# Patient Record
Sex: FEMALE | Race: WHITE | NOT HISPANIC OR LATINO | Employment: UNEMPLOYED | URBAN - METROPOLITAN AREA
[De-identification: names, ages, dates, MRNs, and addresses within clinical notes are randomized per-mention and may not be internally consistent; named-entity substitution may affect disease eponyms.]

---

## 2024-01-18 ENCOUNTER — APPOINTMENT (OUTPATIENT)
Dept: RADIOLOGY | Facility: EXTERNAL LOCATION | Age: 69
End: 2024-01-18
Payer: MEDICARE

## 2024-01-18 ENCOUNTER — TELEPHONE (OUTPATIENT)
Dept: INPATIENT UNIT | Facility: HOSPITAL | Age: 69
End: 2024-01-18

## 2024-01-18 ENCOUNTER — HOSPITAL ENCOUNTER (INPATIENT)
Facility: HOSPITAL | Age: 69
LOS: 8 days | Discharge: HOME | DRG: 444 | End: 2024-01-26
Attending: INTERNAL MEDICINE | Admitting: INTERNAL MEDICINE
Payer: MEDICARE

## 2024-01-18 DIAGNOSIS — K80.50 CHOLEDOCHOLITHIASIS: ICD-10-CM

## 2024-01-18 DIAGNOSIS — T40.2X5A CONSTIPATION DUE TO OPIOID THERAPY: ICD-10-CM

## 2024-01-18 DIAGNOSIS — F41.9 ANXIETY: ICD-10-CM

## 2024-01-18 DIAGNOSIS — K85.20 ALCOHOL-INDUCED ACUTE PANCREATITIS WITHOUT INFECTION OR NECROSIS (HHS-HCC): Primary | ICD-10-CM

## 2024-01-18 DIAGNOSIS — F32.A DEPRESSION, UNSPECIFIED DEPRESSION TYPE: ICD-10-CM

## 2024-01-18 DIAGNOSIS — K59.03 CONSTIPATION DUE TO OPIOID THERAPY: ICD-10-CM

## 2024-01-18 LAB
ABO GROUP (TYPE) IN BLOOD: NORMAL
ALBUMIN SERPL BCP-MCNC: 3.2 G/DL (ref 3.4–5)
ALP SERPL-CCNC: 65 U/L (ref 33–136)
ALT SERPL W P-5'-P-CCNC: 10 U/L (ref 7–45)
ANION GAP SERPL CALC-SCNC: 10 MMOL/L (ref 10–20)
ANTIBODY SCREEN: NORMAL
APTT PPP: 30 SECONDS (ref 27–38)
AST SERPL W P-5'-P-CCNC: 17 U/L (ref 9–39)
BASOPHILS # BLD AUTO: 0.02 X10*3/UL (ref 0–0.1)
BASOPHILS NFR BLD AUTO: 0.3 %
BILIRUB DIRECT SERPL-MCNC: 0.1 MG/DL (ref 0–0.3)
BILIRUB SERPL-MCNC: 0.4 MG/DL (ref 0–1.2)
BUN SERPL-MCNC: 4 MG/DL (ref 6–23)
CALCIUM SERPL-MCNC: 8.9 MG/DL (ref 8.6–10.6)
CHLORIDE SERPL-SCNC: 102 MMOL/L (ref 98–107)
CHOLEST SERPL-MCNC: 136 MG/DL (ref 0–199)
CHOLESTEROL/HDL RATIO: 2.3
CO2 SERPL-SCNC: 31 MMOL/L (ref 21–32)
CREAT SERPL-MCNC: 0.52 MG/DL (ref 0.5–1.05)
EGFRCR SERPLBLD CKD-EPI 2021: >90 ML/MIN/1.73M*2
EOSINOPHIL # BLD AUTO: 0.08 X10*3/UL (ref 0–0.7)
EOSINOPHIL NFR BLD AUTO: 1.4 %
ERYTHROCYTE [DISTWIDTH] IN BLOOD BY AUTOMATED COUNT: 14.3 % (ref 11.5–14.5)
GLUCOSE SERPL-MCNC: 70 MG/DL (ref 74–99)
HCT VFR BLD AUTO: 33.8 % (ref 36–46)
HDLC SERPL-MCNC: 59.3 MG/DL
HGB BLD-MCNC: 11.2 G/DL (ref 12–16)
IMM GRANULOCYTES # BLD AUTO: 0.01 X10*3/UL (ref 0–0.7)
IMM GRANULOCYTES NFR BLD AUTO: 0.2 % (ref 0–0.9)
INR PPP: 1 (ref 0.9–1.1)
LDLC SERPL CALC-MCNC: 64 MG/DL
LIPASE SERPL-CCNC: 132 U/L (ref 9–82)
LYMPHOCYTES # BLD AUTO: 1.96 X10*3/UL (ref 1.2–4.8)
LYMPHOCYTES NFR BLD AUTO: 33.2 %
MAGNESIUM SERPL-MCNC: 1.17 MG/DL (ref 1.6–2.4)
MCH RBC QN AUTO: 34.1 PG (ref 26–34)
MCHC RBC AUTO-ENTMCNC: 33.1 G/DL (ref 32–36)
MCV RBC AUTO: 103 FL (ref 80–100)
MONOCYTES # BLD AUTO: 0.58 X10*3/UL (ref 0.1–1)
MONOCYTES NFR BLD AUTO: 9.8 %
NEUTROPHILS # BLD AUTO: 3.25 X10*3/UL (ref 1.2–7.7)
NEUTROPHILS NFR BLD AUTO: 55.1 %
NON HDL CHOLESTEROL: 77 MG/DL (ref 0–149)
NRBC BLD-RTO: 0 /100 WBCS (ref 0–0)
PHOSPHATE SERPL-MCNC: 3.4 MG/DL (ref 2.5–4.9)
PLATELET # BLD AUTO: 196 X10*3/UL (ref 150–450)
POTASSIUM SERPL-SCNC: 3.4 MMOL/L (ref 3.5–5.3)
PROT SERPL-MCNC: 5.7 G/DL (ref 6.4–8.2)
PROTHROMBIN TIME: 10.9 SECONDS (ref 9.8–12.8)
RBC # BLD AUTO: 3.28 X10*6/UL (ref 4–5.2)
RH FACTOR (ANTIGEN D): NORMAL
SODIUM SERPL-SCNC: 140 MMOL/L (ref 136–145)
TRIGL SERPL-MCNC: 66 MG/DL (ref 0–149)
VLDL: 13 MG/DL (ref 0–40)
WBC # BLD AUTO: 5.9 X10*3/UL (ref 4.4–11.3)

## 2024-01-18 PROCEDURE — 36415 COLL VENOUS BLD VENIPUNCTURE: CPT

## 2024-01-18 PROCEDURE — 80321 ALCOHOLS BIOMARKERS 1OR 2: CPT

## 2024-01-18 PROCEDURE — 2500000001 HC RX 250 WO HCPCS SELF ADMINISTERED DRUGS (ALT 637 FOR MEDICARE OP)

## 2024-01-18 PROCEDURE — 2500000004 HC RX 250 GENERAL PHARMACY W/ HCPCS (ALT 636 FOR OP/ED)

## 2024-01-18 PROCEDURE — 85025 COMPLETE CBC W/AUTO DIFF WBC: CPT

## 2024-01-18 PROCEDURE — 84100 ASSAY OF PHOSPHORUS: CPT

## 2024-01-18 PROCEDURE — 83690 ASSAY OF LIPASE: CPT

## 2024-01-18 PROCEDURE — 85730 THROMBOPLASTIN TIME PARTIAL: CPT

## 2024-01-18 PROCEDURE — 80053 COMPREHEN METABOLIC PANEL: CPT

## 2024-01-18 PROCEDURE — 1210000001 HC SEMI-PRIVATE ROOM DAILY

## 2024-01-18 PROCEDURE — 83735 ASSAY OF MAGNESIUM: CPT

## 2024-01-18 PROCEDURE — 80061 LIPID PANEL: CPT

## 2024-01-18 PROCEDURE — 97161 PT EVAL LOW COMPLEX 20 MIN: CPT | Mod: GP | Performed by: PHYSICAL THERAPIST

## 2024-01-18 PROCEDURE — 86901 BLOOD TYPING SEROLOGIC RH(D): CPT

## 2024-01-18 PROCEDURE — 82248 BILIRUBIN DIRECT: CPT

## 2024-01-18 RX ORDER — MAGNESIUM SULFATE HEPTAHYDRATE 40 MG/ML
4 INJECTION, SOLUTION INTRAVENOUS ONCE
Status: DISCONTINUED | OUTPATIENT
Start: 2024-01-18 | End: 2024-01-18

## 2024-01-18 RX ORDER — OXYCODONE HYDROCHLORIDE 5 MG/1
7.5 TABLET ORAL EVERY 4 HOURS PRN
Status: COMPLETED | OUTPATIENT
Start: 2024-01-18 | End: 2024-01-18

## 2024-01-18 RX ORDER — MAGNESIUM SULFATE HEPTAHYDRATE 40 MG/ML
4 INJECTION, SOLUTION INTRAVENOUS ONCE
Status: COMPLETED | OUTPATIENT
Start: 2024-01-18 | End: 2024-01-18

## 2024-01-18 RX ORDER — POTASSIUM CHLORIDE 20 MEQ/1
40 TABLET, EXTENDED RELEASE ORAL ONCE
Status: COMPLETED | OUTPATIENT
Start: 2024-01-18 | End: 2024-01-18

## 2024-01-18 RX ORDER — LANOLIN ALCOHOL/MO/W.PET/CERES
100 CREAM (GRAM) TOPICAL DAILY
Status: DISCONTINUED | OUTPATIENT
Start: 2024-01-18 | End: 2024-01-26 | Stop reason: HOSPADM

## 2024-01-18 RX ORDER — TALC
6 POWDER (GRAM) TOPICAL NIGHTLY
Status: DISCONTINUED | OUTPATIENT
Start: 2024-01-18 | End: 2024-01-26 | Stop reason: HOSPADM

## 2024-01-18 RX ORDER — ENOXAPARIN SODIUM 100 MG/ML
40 INJECTION SUBCUTANEOUS EVERY 24 HOURS
Status: DISCONTINUED | OUTPATIENT
Start: 2024-01-18 | End: 2024-01-26 | Stop reason: HOSPADM

## 2024-01-18 RX ORDER — SERTRALINE HYDROCHLORIDE 100 MG/1
100 TABLET, FILM COATED ORAL DAILY
Status: DISCONTINUED | OUTPATIENT
Start: 2024-01-18 | End: 2024-01-26 | Stop reason: HOSPADM

## 2024-01-18 RX ORDER — ENOXAPARIN SODIUM 100 MG/ML
40 INJECTION SUBCUTANEOUS EVERY 24 HOURS
Status: DISCONTINUED | OUTPATIENT
Start: 2024-01-18 | End: 2024-01-18

## 2024-01-18 RX ORDER — ACETAMINOPHEN 650 MG/1
650 SUPPOSITORY RECTAL EVERY 4 HOURS PRN
Status: DISCONTINUED | OUTPATIENT
Start: 2024-01-18 | End: 2024-01-21

## 2024-01-18 RX ORDER — TRAZODONE HYDROCHLORIDE 50 MG/1
100 TABLET ORAL NIGHTLY
Status: DISCONTINUED | OUTPATIENT
Start: 2024-01-18 | End: 2024-01-26 | Stop reason: HOSPADM

## 2024-01-18 RX ORDER — ACETAMINOPHEN 160 MG/5ML
650 SOLUTION ORAL EVERY 4 HOURS PRN
Status: DISCONTINUED | OUTPATIENT
Start: 2024-01-18 | End: 2024-01-21

## 2024-01-18 RX ORDER — ACETAMINOPHEN 325 MG/1
650 TABLET ORAL EVERY 4 HOURS PRN
Status: DISCONTINUED | OUTPATIENT
Start: 2024-01-18 | End: 2024-01-21

## 2024-01-18 RX ORDER — POLYETHYLENE GLYCOL 3350 17 G/17G
17 POWDER, FOR SOLUTION ORAL NIGHTLY
Status: DISCONTINUED | OUTPATIENT
Start: 2024-01-18 | End: 2024-01-22

## 2024-01-18 RX ORDER — SUCRALFATE 1 G/1
1 TABLET ORAL
Status: DISCONTINUED | OUTPATIENT
Start: 2024-01-18 | End: 2024-01-26 | Stop reason: HOSPADM

## 2024-01-18 RX ORDER — HYDROMORPHONE HYDROCHLORIDE 2 MG/1
1 TABLET ORAL
Status: DISCONTINUED | OUTPATIENT
Start: 2024-01-18 | End: 2024-01-18

## 2024-01-18 RX ORDER — BUPROPION HYDROCHLORIDE 150 MG/1
150 TABLET ORAL DAILY
Status: DISCONTINUED | OUTPATIENT
Start: 2024-01-18 | End: 2024-01-26 | Stop reason: HOSPADM

## 2024-01-18 RX ORDER — ENOXAPARIN SODIUM 100 MG/ML
30 INJECTION SUBCUTANEOUS EVERY 24 HOURS
Status: DISCONTINUED | OUTPATIENT
Start: 2024-01-18 | End: 2024-01-18

## 2024-01-18 RX ORDER — ARIPIPRAZOLE 2 MG/1
2 TABLET ORAL NIGHTLY
Status: DISCONTINUED | OUTPATIENT
Start: 2024-01-18 | End: 2024-01-26 | Stop reason: HOSPADM

## 2024-01-18 RX ORDER — POTASSIUM CHLORIDE 1.5 G/1.58G
40 POWDER, FOR SOLUTION ORAL ONCE
Status: COMPLETED | OUTPATIENT
Start: 2024-01-18 | End: 2024-01-18

## 2024-01-18 RX ADMIN — OXYCODONE HYDROCHLORIDE 7.5 MG: 5 TABLET ORAL at 05:11

## 2024-01-18 RX ADMIN — OXYCODONE HYDROCHLORIDE 7.5 MG: 5 TABLET ORAL at 09:22

## 2024-01-18 RX ADMIN — ACETAMINOPHEN 650 MG: 325 TABLET ORAL at 11:18

## 2024-01-18 RX ADMIN — SERTRALINE HYDROCHLORIDE 100 MG: 100 TABLET ORAL at 08:24

## 2024-01-18 RX ADMIN — MELATONIN 6 MG: 3 TAB ORAL at 20:15

## 2024-01-18 RX ADMIN — POTASSIUM CHLORIDE 40 MEQ: 1.5 POWDER, FOR SOLUTION ORAL at 14:58

## 2024-01-18 RX ADMIN — POTASSIUM CHLORIDE 40 MEQ: 1500 TABLET, EXTENDED RELEASE ORAL at 17:20

## 2024-01-18 RX ADMIN — ACETAMINOPHEN 650 MG: 325 TABLET ORAL at 14:58

## 2024-01-18 RX ADMIN — ARIPIPRAZOLE 2 MG: 2 TABLET ORAL at 20:15

## 2024-01-18 RX ADMIN — BUPROPION HYDROCHLORIDE 150 MG: 150 TABLET, FILM COATED, EXTENDED RELEASE ORAL at 08:24

## 2024-01-18 RX ADMIN — ACETAMINOPHEN 650 MG: 325 TABLET ORAL at 19:05

## 2024-01-18 RX ADMIN — MAGNESIUM SULFATE 4 G: 4 INJECTION INTRAVENOUS at 11:18

## 2024-01-18 RX ADMIN — ACETAMINOPHEN 650 MG: 325 TABLET ORAL at 05:11

## 2024-01-18 RX ADMIN — ENOXAPARIN SODIUM 40 MG: 100 INJECTION SUBCUTANEOUS at 14:58

## 2024-01-18 RX ADMIN — TRAZODONE HYDROCHLORIDE 100 MG: 50 TABLET ORAL at 20:15

## 2024-01-18 RX ADMIN — THIAMINE HCL TAB 100 MG 100 MG: 100 TAB at 08:24

## 2024-01-18 RX ADMIN — SODIUM CHLORIDE, POTASSIUM CHLORIDE, SODIUM LACTATE AND CALCIUM CHLORIDE 500 ML: 600; 310; 30; 20 INJECTION, SOLUTION INTRAVENOUS at 05:13

## 2024-01-18 SDOH — SOCIAL STABILITY: SOCIAL INSECURITY: DO YOU FEEL ANYONE HAS EXPLOITED OR TAKEN ADVANTAGE OF YOU FINANCIALLY OR OF YOUR PERSONAL PROPERTY?: NO

## 2024-01-18 SDOH — SOCIAL STABILITY: SOCIAL INSECURITY: DO YOU FEEL UNSAFE GOING BACK TO THE PLACE WHERE YOU ARE LIVING?: NO

## 2024-01-18 SDOH — SOCIAL STABILITY: SOCIAL INSECURITY: HAVE YOU HAD THOUGHTS OF HARMING ANYONE ELSE?: NO

## 2024-01-18 SDOH — SOCIAL STABILITY: SOCIAL INSECURITY: ARE YOU OR HAVE YOU BEEN THREATENED OR ABUSED PHYSICALLY, EMOTIONALLY, OR SEXUALLY BY ANYONE?: NO

## 2024-01-18 SDOH — SOCIAL STABILITY: SOCIAL INSECURITY: HAS ANYONE EVER THREATENED TO HURT YOUR FAMILY OR YOUR PETS?: NO

## 2024-01-18 SDOH — SOCIAL STABILITY: SOCIAL INSECURITY: DOES ANYONE TRY TO KEEP YOU FROM HAVING/CONTACTING OTHER FRIENDS OR DOING THINGS OUTSIDE YOUR HOME?: NO

## 2024-01-18 SDOH — SOCIAL STABILITY: SOCIAL INSECURITY: ABUSE: ADULT

## 2024-01-18 SDOH — SOCIAL STABILITY: SOCIAL INSECURITY: WERE YOU ABLE TO COMPLETE ALL THE BEHAVIORAL HEALTH SCREENINGS?: YES

## 2024-01-18 SDOH — SOCIAL STABILITY: SOCIAL INSECURITY: ARE THERE ANY APPARENT SIGNS OF INJURIES/BEHAVIORS THAT COULD BE RELATED TO ABUSE/NEGLECT?: NO

## 2024-01-18 ASSESSMENT — PAIN SCALES - GENERAL
PAINLEVEL_OUTOF10: 5 - MODERATE PAIN
PAINLEVEL_OUTOF10: 6
PAINLEVEL_OUTOF10: 6
PAINLEVEL_OUTOF10: 5 - MODERATE PAIN
PAINLEVEL_OUTOF10: 7

## 2024-01-18 ASSESSMENT — ACTIVITIES OF DAILY LIVING (ADL)
GROOMING: INDEPENDENT
PATIENT'S MEMORY ADEQUATE TO SAFELY COMPLETE DAILY ACTIVITIES?: YES
BATHING: INDEPENDENT
ADL_ASSISTANCE: INDEPENDENT
DRESSING YOURSELF: INDEPENDENT
JUDGMENT_ADEQUATE_SAFELY_COMPLETE_DAILY_ACTIVITIES: YES
ADEQUATE_TO_COMPLETE_ADL: YES
HEARING - LEFT EAR: FUNCTIONAL
WALKS IN HOME: INDEPENDENT
LACK_OF_TRANSPORTATION: NO
TOILETING: INDEPENDENT
FEEDING YOURSELF: INDEPENDENT
HEARING - RIGHT EAR: FUNCTIONAL

## 2024-01-18 ASSESSMENT — PAIN DESCRIPTION - LOCATION
LOCATION: ABDOMEN
LOCATION: BACK
LOCATION: ABDOMEN
LOCATION: ABDOMEN

## 2024-01-18 ASSESSMENT — COGNITIVE AND FUNCTIONAL STATUS - GENERAL
MOBILITY SCORE: 19
CLIMB 3 TO 5 STEPS WITH RAILING: A LOT
MOVING TO AND FROM BED TO CHAIR: A LITTLE
STANDING UP FROM CHAIR USING ARMS: A LITTLE
PATIENT BASELINE BEDBOUND: NO
MOBILITY SCORE: 24
DAILY ACTIVITIY SCORE: 24
WALKING IN HOSPITAL ROOM: A LITTLE

## 2024-01-18 ASSESSMENT — PATIENT HEALTH QUESTIONNAIRE - PHQ9
SUM OF ALL RESPONSES TO PHQ9 QUESTIONS 1 & 2: 0
2. FEELING DOWN, DEPRESSED OR HOPELESS: NOT AT ALL
1. LITTLE INTEREST OR PLEASURE IN DOING THINGS: NOT AT ALL

## 2024-01-18 ASSESSMENT — LIFESTYLE VARIABLES
HOW MANY STANDARD DRINKS CONTAINING ALCOHOL DO YOU HAVE ON A TYPICAL DAY: 1 OR 2
AUDIT-C TOTAL SCORE: 1
PRESCIPTION_ABUSE_PAST_12_MONTHS: NO
HOW OFTEN DO YOU HAVE 6 OR MORE DRINKS ON ONE OCCASION: NEVER
HOW OFTEN DO YOU HAVE A DRINK CONTAINING ALCOHOL: MONTHLY OR LESS
SKIP TO QUESTIONS 9-10: 1
AUDIT-C TOTAL SCORE: 1
SUBSTANCE_ABUSE_PAST_12_MONTHS: NO

## 2024-01-18 ASSESSMENT — COLUMBIA-SUICIDE SEVERITY RATING SCALE - C-SSRS
2. HAVE YOU ACTUALLY HAD ANY THOUGHTS OF KILLING YOURSELF?: NO
6. HAVE YOU EVER DONE ANYTHING, STARTED TO DO ANYTHING, OR PREPARED TO DO ANYTHING TO END YOUR LIFE?: NO
1. IN THE PAST MONTH, HAVE YOU WISHED YOU WERE DEAD OR WISHED YOU COULD GO TO SLEEP AND NOT WAKE UP?: NO

## 2024-01-18 ASSESSMENT — PAIN - FUNCTIONAL ASSESSMENT
PAIN_FUNCTIONAL_ASSESSMENT: 0-10

## 2024-01-18 NOTE — H&P
"Gastroenterology Night Float Admission Note    HPI  Ms. Massey is a 70 yo current smoker with a PMHx/PSHx significant for Bilroth II for perforated gastric ulcer, GJ ulcers, labeled to have chronic pancreatitis, chronic lower back pain s/p 3 surgeries, who presented to [facility] for abdominal pain, found to have pancreatitis and was transferred to [facility] for potential ERCP given findings on MRI c/f choledocholithiasis     She reports pain started Friday and she went to the hospital on Monday. The pain is all over and goes to her back. Associated with nausea and vomiting. Denies Hx of joint pains and/or chronic abdominal pain. She said she did well with a clear liquid diet, but had vomiting with a full liquid diet.      She reports a recent hospitalization in November for an abdominal abscess. Chart Review reveals admission with sepsis s/p drainage of LUQ abscess with negative cultures.     Reports occasional drinking, Last drink Thursday, 2 shots of vodka and tonic.      Denies Hx of strokes and heart attacks. Denies Hx of heart failure. Denies orthopnea and PND. Denies leg swelling. Denies headaches. Denies chest pain. Denies SOB. Denies fevers. Denies burning urination.    Discharge summary from OSH:   \"The patient is a 68 y.o. female ETOH/ Chronic pain with Failed low back / extensive GI history -with prior perforated Gastric ulcer, prior previous Billroth II  / on going smoking, and intraabdominal abscess s/p IR percutaneous drain and abx in November of last year. She had abd pain stat started on Friday. No fever, chills. She was sent to ED from PCP office due to abdominal pain with nausea and emesis. In ED labs sig for lipase of 880, Ct abd with pancreatic ductal abnormality and cytic lesions. Follow up MRI suggesting possible choledocholithiasis.  GI was consulted due to the complex abdomen they recommended transfer to [facility] for advance endoscopy evaluation in setting of prior Billroth II " "procedure. She received PO oxy and IV dilaudid for pain management. She was transferred in stable condition.\"    Medications at the OSH (patient unsure of home meds, does not have list on her, does not use inhalers):  Scheduled Meds: ARIPiprazole, 2 mg, Oral, Daily  buPROPion XL, 150 mg, Oral, Daily  enoxaparin, 40 mg, SubCUTAneous, Daily  melatonin, 6 mg, Oral, Nightly  pantoprazole, 40 mg, Oral, qAM AC  sertraline, 100 mg, Oral, Daily  sodium chloride 0.9%, 10 mL, IntraVENous, 2 times per day  sucralfate, 1 g, Oral, TID AC  thiamine, 100 mg, Oral, Daily  traZODone, 100 mg, Oral, Nightly     Continuous Infusions:lactated Ringer's, 150 mL/hr, Last Rate: 150 mL/hr (01/17/24 0803)     PRN Meds:PRN medications: HYDROmorphone, naloxone, ondansetron ODT **OR** ondansetron, oxyCODONE **OR** oxyCODONE, polyethylene glycol (PEG) 3350, sodium chloride, sodium chloride 0.9%        At the outside hospital (latest data):  - Labs:   CBC: WBC 5.3, Hgb 12.0, plt 177   BMP: Na 134, K 3.0, Cl 103, HCO3 24, BUN 5, Cr 0.51, glu 73   LFT: Ca 8.4, tprot 5.6, alb 3.0, alkphos 62, AST 33, ALT 11, tbili 0.5  Lipase 880, Triglycerides:96, ethanol <0.010  Trop: 0.032    UA: 1+ protein, Specific gravity >1.030, otherwise normal      - Imaging:  CT abdomen/pelvis with IV contrast 1/15/2024:   No acute abdominal or pelvic process is appreciated.  No mass or inflammatory process is seen.  There has been a prior cholecystectomy and mild extrahepatic biliary ductal dilatation.  In addition there is mild pancreatic ductal dislocation as well as several subcentimeter cystic lesions involving the pancreas of uncertain etiology.  Further evaluation with MRI may be of benefit and can be performed on a nonemergent basis.    MRI abdomen without IV contrast 1/16/2024:   1.  Limited evaluation in part due to motion artifact.   2.   Moderate intrahepatic biliary ductal dilatation and dilatation of the common bile duct measures up to 12 mm in caliber with " slight distal tapering. Apparent 5 mm ovoid filling defect in the distal periampullary common bile duct on coronal sequences with abrupt concave cut off/meniscal sign present on MRCP sequences, suggesting possible choledocholithiasis. Findings however potentially could be artifactual as they are only seen on coronal sequences and MRCP sequences without definitive correlate on axial sequences.   3.  Suspicion of mild acute pancreatitis.   4.  Dilatation of the main pancreatic duct measuring up to at least 4 to 5 mm at the level of the pancreatic body and tail with some abrupt but smooth tapering at the level of the pancreatic head where the pancreatic duct is normal in caliber.   5.  Some luminal irregularity and beading involving the pancreatic duct at the level of the pancreatic tail, probably due to prior pancreatitis. Several small pancreatic cystic lesions measuring up to 10 x 6 mm at the pancreatic tail. Additional smaller lesions (measuring up to 6 x 6 mm) and punctate cystic lesions towards the pancreatic body and head/uncinate. These may potentially communicate with the main pancreatic duct. Consider sidebranch IPMN, dilated side branch ducts, or possibly pseudocysts. Follow-up with MRI/MRCP in one year or as per clinical protocol.      PMH: as above  SurgHx: as above  Allergies: Reported none, then asked about naproxen, stated dry heaving, then   SocHx: EtOH: states is an occasional drinker (last drink Thursday 2 vodka tonics), Tobacco: 1ppd since age 20, Drugs: denies  FamHx: denies Hx of pancreatic disease, or cancers       PHYSICAL EXAM:  General: awake, alert, oriented to time, place, and self  HEENT: anicteric sclera  Chest: ctab, ?crackles left lower zones, no wheezing   Cardiac: regular rate and rhythm, normal s1, s2, no M/R/G  Abdomen:  soft, non-distended, tender throughout   EXT: no peripheral edema  Neuro: AOx3, moving all limbs freely     Assessment & Plan  Ms. Massey is a 68 yo current smoker,  current alcohol user, with a PMHx/PSHx significant for Bilroth II for perforated gastric ulcer, GJ ulcers, labeled to have chronic pancreatitis, chronic lower back pain s/p 3 surgeries, who presented to [facility] for abdominal pain, found to have pancreatitis and was transferred to [facility] for potential ERCP given findings on MRI c/f choledocholithiasis. Patient reports stomach pain since Friday radiating to the back associated with N/V. Was treated with fluids and pain meds at the OSH. CT with mild extrahepatic biliary ductal dilatation and mild pancreatic ductal dislocation as well as several subcentimeter cystic lesions involving the pancreas of uncertain etiology. MRI with Moderate intrahepatic biliary ductal dilatation and dilatation of the common bile duct measures up to 12 mm with slight distal tapering. Apparent 5 mm ovoid filling defect in the distal periampullary common bile duct on coronal sequences with abrupt concave cut off/meniscal sign present on MRCP sequences, suggesting possible choledocholithiasis. Findings however potentially could be artifactual as they are only seen on coronal sequences and MRCP sequences without definitive correlate on axial sequences. Dilatation of the main pancreatic duct measuring up to at least 4 to 5 mm at the level of the pancreatic body and tail with some abrupt but smooth tapering at the level of the pancreatic head where the pancreatic duct is normal in caliber.   Some luminal irregularity and beading involving the pancreatic duct at the level of the pancreatic tail, probably due to prior pancreatitis, and Several small pancreatic cystic lesions     #Impression  Pancreatitis: Alcoholic vs. stone related     #Abdominal Pain   #Acute Pancreatitis potentially due to choledocholithiasis   # ??? Chronic pancreatitis   #Pancreatic duct dilation  #Pancreatic cysts   ::lipase 880 at the outside hospital with typical symptoms (abdominal pain radiating to the  back)  ::possibility of choledocholithiasis on MRI (though can be artifiact)   ::s/p fluids in the OSH (150cc/hr LR)  -potential ERCP in the AM   -T+S, coags, NPO   -triglyceride level  -500cc LR for now, consider more in the AM (was on 150cc/hr at OSH)   -CD of images in chart, day team to take to Rad Ops once they open to upload to PACS  -will need follow up MRI/MRCP in one year   -hold sucralfate 1g TIDAC for now  -oxy 7.5mg q4hr PRN for 2 doses, day team to order addition   -tylenol     #Hypokalemia  ::likely due to GI losses   ::unclear if got repletion at OSH   -repeat labs before repletion     #Macrocytic Anemia  #Vitamin B12 deficiency   ::could be related to partial gastric resection   -repeat Vitamin B12   -consider IM B12 injections     #Misc  -continue aripiprazole 2mg QHS   -continue trazodone 100mg QHS  -continue melatonin 6mg QHS   -continue sertraline 100mg daily   -continue bupropion 150mg XL daily   -continue thiamine 100mg daily       F: 500cc now  E: PRN  N: NPO for potential procedure  A: PIVs  DVT: Lovenox (to start in the evening, follow post ERCP recs, may need to be stopped if intra procedure bleeding happens)  GI: Pantoprazole 40mg daily (continued from OSH)  SDM:  Earl: [phone]  Code Status: Full code (does not want to be machine dependent for a prolonged time)    Patient admitted by the  Night Float service and is to be staffed with the attending physician in the AM      Gabe Martin   Internal Medicine PGY-2   [facility]

## 2024-01-18 NOTE — PROGRESS NOTES
"Cece Massey is a 68 y.o. female on day 0 of admission presenting with Choledocholithiasis.    Subjective   Patient reports abdominal pain. Has not had emesis in a few days since her diet was advanced at OSH, has not had a bowel movement in several days. States she was able to tolerate clear liquid diet. Denies fevers, chills.       Objective     Physical Exam  Constitutional:       General: She is not in acute distress.     Appearance: Normal appearance. She is not ill-appearing or toxic-appearing.   HENT:      Head: Normocephalic and atraumatic.   Eyes:      Conjunctiva/sclera: Conjunctivae normal.   Cardiovascular:      Rate and Rhythm: Normal rate and regular rhythm.      Pulses: Normal pulses.      Heart sounds: Normal heart sounds.   Pulmonary:      Effort: Pulmonary effort is normal.   Abdominal:      General: Abdomen is flat. Bowel sounds are normal. There is no distension.      Palpations: Abdomen is soft.      Comments: Tender in epigastric area   Musculoskeletal:         General: No swelling.      Right lower leg: No edema.      Left lower leg: No edema.   Skin:     Capillary Refill: Capillary refill takes 2 to 3 seconds.   Neurological:      General: No focal deficit present.      Mental Status: She is alert and oriented to person, place, and time. Mental status is at baseline.   Psychiatric:         Mood and Affect: Mood normal.         Behavior: Behavior normal.         Last Recorded Vitals  Blood pressure 133/79, pulse 93, temperature 36.7 °C (98.1 °F), resp. rate 20, height 1.676 m (5' 6\"), weight 50.8 kg (112 lb), SpO2 96 %.  Intake/Output last 3 Shifts:  No intake/output data recorded.    Relevant Results  Results for orders placed or performed during the hospital encounter of 01/18/24 (from the past 24 hour(s))   CBC and Auto Differential   Result Value Ref Range    WBC 5.9 4.4 - 11.3 x10*3/uL    nRBC 0.0 0.0 - 0.0 /100 WBCs    RBC 3.28 (L) 4.00 - 5.20 x10*6/uL    Hemoglobin 11.2 (L) 12.0 - 16.0 " g/dL    Hematocrit 33.8 (L) 36.0 - 46.0 %     (H) 80 - 100 fL    MCH 34.1 (H) 26.0 - 34.0 pg    MCHC 33.1 32.0 - 36.0 g/dL    RDW 14.3 11.5 - 14.5 %    Platelets 196 150 - 450 x10*3/uL    Neutrophils % 55.1 40.0 - 80.0 %    Immature Granulocytes %, Automated 0.2 0.0 - 0.9 %    Lymphocytes % 33.2 13.0 - 44.0 %    Monocytes % 9.8 2.0 - 10.0 %    Eosinophils % 1.4 0.0 - 6.0 %    Basophils % 0.3 0.0 - 2.0 %    Neutrophils Absolute 3.25 1.20 - 7.70 x10*3/uL    Immature Granulocytes Absolute, Automated 0.01 0.00 - 0.70 x10*3/uL    Lymphocytes Absolute 1.96 1.20 - 4.80 x10*3/uL    Monocytes Absolute 0.58 0.10 - 1.00 x10*3/uL    Eosinophils Absolute 0.08 0.00 - 0.70 x10*3/uL    Basophils Absolute 0.02 0.00 - 0.10 x10*3/uL   Type And Screen   Result Value Ref Range    ABO TYPE A     Rh TYPE POS     ANTIBODY SCREEN NEG    Magnesium   Result Value Ref Range    Magnesium 1.17 (L) 1.60 - 2.40 mg/dL   Hepatic Function Panel   Result Value Ref Range    Albumin 3.2 (L) 3.4 - 5.0 g/dL    Bilirubin, Total 0.4 0.0 - 1.2 mg/dL    Bilirubin, Direct 0.1 0.0 - 0.3 mg/dL    Alkaline Phosphatase 65 33 - 136 U/L    ALT 10 7 - 45 U/L    AST 17 9 - 39 U/L    Total Protein 5.7 (L) 6.4 - 8.2 g/dL   Lipase   Result Value Ref Range    Lipase 132 (H) 9 - 82 U/L   Phosphorus   Result Value Ref Range    Phosphorus 3.4 2.5 - 4.9 mg/dL   Basic Metabolic Panel   Result Value Ref Range    Glucose 70 (L) 74 - 99 mg/dL    Sodium 140 136 - 145 mmol/L    Potassium 3.4 (L) 3.5 - 5.3 mmol/L    Chloride 102 98 - 107 mmol/L    Bicarbonate 31 21 - 32 mmol/L    Anion Gap 10 10 - 20 mmol/L    Urea Nitrogen 4 (L) 6 - 23 mg/dL    Creatinine 0.52 0.50 - 1.05 mg/dL    eGFR >90 >60 mL/min/1.73m*2    Calcium 8.9 8.6 - 10.6 mg/dL   Coagulation Screen   Result Value Ref Range    Protime 10.9 9.8 - 12.8 seconds    INR 1.0 0.9 - 1.1    aPTT 30 27 - 38 seconds   Lipid panel   Result Value Ref Range    Cholesterol 136 0 - 199 mg/dL    HDL-Cholesterol 59.3 mg/dL     Cholesterol/HDL Ratio 2.3     LDL Calculated 64 <=99 mg/dL    VLDL 13 0 - 40 mg/dL    Triglycerides 66 0 - 149 mg/dL    Non HDL Cholesterol 77 0 - 149 mg/dL              Assessment/Plan   Principal Problem:    Choledocholithiasis      Ms. Massey is a 68 yo F with h/o Bilroth II for perforated gastric ulcer, cholecystectomy, tobacco use disorder, chronic back pain, anxiety and depression who presented to an OSH with n/v, abd pain with labs showing lipase >800, consistent with acute pancreatitis, transferred to [facility] for possible ERCP given findings of possible choledocholithiasis on MRI/MRCP.       Updates 1/18:  - OSH imaging uploaded to PACS, pending read; will review for choledocholithiasis       #Acute pancreatitis  #Pancreatic duct dilation  :: Patient presented with n/v, abd pain, lipase 880 at OSH. Received supportive care, did not tolerate full liquid diet  :: Etiologies include etoh, gallstones given history and imaging. Patient denies trauma, TGs and calcium wnl, not on steroids  :: MRI/MRCP at OSH(1/16): Apparent 5 mm ovoid filling defect in the distal periampullary common bile duct on coronal sequences with abrupt concave cut off/meniscal sign present on MRCP sequences, suggesting possible choledocholithiasis. Dilatation of the main pancreatic duct measuring up to at least 4 to 5 mm at the level of the pancreatic body and tail   - Clear liquid diet, will advance as tolerated  - Tylenol 650mg q4h PRN        #Pancreatic cysts  :: MRI/MRCP at OSH (1/16): Several small pancreatic cystic lesions measuring up to 10 x 6 mm at the pancreatic tail. Additional smaller lesions (measuring up to 6 x 6 mm) and punctate cystic lesions towards the pancreatic body and head/uncinate. These may potentially communicate with the main pancreatic duct.   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  - Daily B12 supplementation        #Anxiety  #Depression    -continue aripiprazole 2mg QHS   -continue trazodone 100mg QHS  -continue melatonin 6mg QHS   -continue sertraline 100mg daily   -continue bupropion 150mg XL daily   -continue thiamine 100mg daily        #Tobacco use disorder  :: Patient declined nicotine replacement      F: PRN  E: PRN  N: Clear liquid diet  A: PIVs  DVT: Lovenox   GI: Pantoprazole 40mg daily (continued from OSH)    SDM:  Earl: [phone]  Code Status: Full code (does not want to be machine dependent for a prolonged time)    Patient seen and staffed with attending physician.  Janie Gee MD  Internal Medicine, PGY-1

## 2024-01-18 NOTE — PROGRESS NOTES
Physical Therapy    Physical Therapy Evaluation    Patient Name: Cece Massey  MRN: [id number]  Today's Date: 1/18/2024   Time Calculation  Start Time: 0914  Stop Time: 0931  Time Calculation (min): 17 min    Assessment/Plan   PT Assessment  PT Assessment Results: Decreased strength, Decreased endurance, Impaired balance, Decreased mobility, Pain  Rehab Prognosis: Good  Evaluation/Treatment Tolerance: Patient tolerated treatment well  End of Session Communication: Bedside nurse  Assessment Comment: Pt. is a 69 y/o F admitted for ERCP. Pt. presents with abdominal pain, impaired balance, decreased endurance, and difficulty with all functional mobility compared to baseline. Pt. would benefit from skilled PT while IP to address these deficits.  End of Session Patient Position: Up in chair, Alarm off, not on at start of session  IP OR SWING BED PT PLAN  Inpatient or Swing Bed: Inpatient  PT Plan  Treatment/Interventions: Bed mobility, Transfer training, Gait training, Stair training, Balance training, Strengthening, Endurance training, Therapeutic exercise, Therapeutic activity, Home exercise program  PT Plan: Skilled PT  PT Frequency: 3 times per week  PT Discharge Recommendations: Low intensity level of continued care  Equipment Recommended upon Discharge:  (Pt. may benefit from rollator secondary to limited standing tolerance.)  PT Recommended Transfer Status: Assist x1  PT - OK to Discharge: Yes (PT evaluation complete and rehab recommendations made.)    Subjective         General Visit Information:  General  Reason for Referral: transferred to [facility] for potential ERCP given findings on MRI c/f choledocholithiasis  Past Medical History Relevant to Rehab: PMHx/PSHx significant for Bilroth II for perforated gastric ulcer, GJ ulcers, labeled to have chronic pancreatitis, chronic lower back pain s/p 3 surgeries  Family/Caregiver Present: Yes  Caregiver Feedback: supportive  present at the bedside throughout  session.  Prior to Session Communication: Bedside nurse  Patient Position Received:  (standing in bathroom)  General Comment: Pt. returning from bathroom upon arrival. Endorses abdominal pain but pleasant and agreeable to participate    Home Living:  Home Living  Type of Home: Mobile home  Lives With: Spouse ( able to provide 24/7 support)  Home Adaptive Equipment: None  Home Layout: One level  Home Access: Stairs to enter with rails  Entrance Stairs-Number of Steps: 3  Bathroom Shower/Tub: Tub/shower unit  Bathroom Equipment: Shower chair with back    Prior Level of Function:  Prior Function Per Pt/Caregiver Report  Level of [place]: Independent with ADLs and functional transfers, Needs assistance with homemaking ( assists with cleaning)  Receives Help From:  ()  ADL Assistance: Independent (increased time and effort secondary to back pain with flexion and limited standing tolerance)  Homemaking Assistance: Needs assistance  Driving/Transportation: Independent  Ambulatory Assistance: Independent (no AD. Able to ambulate ~1 block. Limited by back pain.)    Precautions:  Precautions  Medical Precautions: Fall precautions         Objective     Pain:  Pain Assessment  Pain Assessment: 0-10  Pain Score: 7  Pain Type: Acute pain  Pain Location: Abdomen  Pain Interventions:  (RN administering PO pain medication at start of session.)    Cognition:  Cognition  Overall Cognitive Status: Within Functional Limits  Insight: Mild    General Assessments:      Activity Tolerance  Endurance: Decreased tolerance for upright activites  Sensation  Light Touch:  (endorses tingling in L foot. Grossly in tact to light touch BLE's)  Strength  Strength Comments: B LE's grossly > 4/5 throughout.        Postural Control  Postural Control: Impaired (+kyphotic posture with rounded shoulders and downcast gaze)  Static Sitting Balance  Static Sitting-Balance Support: Feet supported, Bilateral upper extremity  supported  Static Sitting-Level of Assistance: Modified independent  Dynamic Sitting Balance  Dynamic Sitting-Balance Support: Feet supported, Bilateral upper extremity supported  Dynamic Sitting-Balance:  (SBA for LE MMT)  Static Standing Balance  Static Standing-Balance Support: Bilateral upper extremity supported (with RW)  Static Standing-Level of Assistance: Contact guard  Dynamic Standing Balance  Dynamic Standing-Balance Support: Bilateral upper extremity supported  Dynamic Standing-Balance:  (CGA for hallway ambulation with RW)    Functional Assessments:     Bed Mobility  Bed Mobility: No (not observed this visit. Anticipate SBA)  Transfers  Transfer: Yes  Transfer 1  Technique 1: Sit to stand, Stand to sit  Transfer Device 1: Walker  Transfer Level of Assistance 1: Contact guard  Ambulation/Gait Training  Ambulation/Gait Training Performed: Yes  Ambulation/Gait Training 1  Surface 1: Level tile  Device 1: Rolling walker  Assistance 1: Contact guard  Quality of Gait 1: Diminished heel strike, Forward flexed posture (decreased ana, B shoulder elevation somewhat improved with cues for posture. No LOB noted with RW.)  Comments/Distance (ft) 1: 35 ft  Stairs  Stairs: No                     Outcome Measures:  [facility] [address] [facility] Basic Mobility  Turning from your back to your side while in a flat bed without using bedrails: None  Moving from lying on your back to sitting on the side of a flat bed without using bedrails: None  Moving to and from bed to chair (including a wheelchair): A little  Standing up from a chair using your arms (e.g. wheelchair or bedside chair): A little  To walk in hospital room: A little  Climbing 3-5 steps with railing: A lot  Basic Mobility - Total Score: 19                            Goals:  Encounter Problems       Encounter Problems (Active)       PT Problem       Pt. will be independent with transfers with LRD        Start:  01/18/24    Expected End:  02/01/24            Pt. will ambulate >  150 ft. with LRD for safe household ambulation        Start:  01/18/24    Expected End:  02/01/24            Pt. will ascend/descend 3 steps with 1 handrail with SBA to safely enter/exit the home set up        Start:  01/18/24    Expected End:  02/01/24            Pt. will be independent with B LE strengthening therex program.        Start:  01/18/24    Expected End:  02/01/24                 Education Documentation  Body Mechanics, taught by Jyoti Turner, PT at 1/18/2024 12:52 PM.  Learner: Significant Other, Patient  Readiness: Acceptance  Method: Explanation  Response: Verbalizes Understanding    Mobility Training, taught by Jyoti Turner, PT at 1/18/2024 12:52 PM.  Learner: Significant Other, Patient  Readiness: Acceptance  Method: Explanation  Response: Verbalizes Understanding

## 2024-01-18 NOTE — CARE PLAN
The patient's goals for the shift include      The clinical goals for the shift include Pt will be free of any pain

## 2024-01-19 ENCOUNTER — APPOINTMENT (OUTPATIENT)
Dept: GASTROENTEROLOGY | Facility: HOSPITAL | Age: 69
DRG: 444 | End: 2024-01-19
Payer: MEDICARE

## 2024-01-19 ENCOUNTER — ANESTHESIA EVENT (OUTPATIENT)
Dept: GASTROENTEROLOGY | Facility: HOSPITAL | Age: 69
DRG: 444 | End: 2024-01-19
Payer: MEDICARE

## 2024-01-19 ENCOUNTER — ANESTHESIA (OUTPATIENT)
Dept: GASTROENTEROLOGY | Facility: HOSPITAL | Age: 69
DRG: 444 | End: 2024-01-19
Payer: MEDICARE

## 2024-01-19 LAB
ALBUMIN SERPL BCP-MCNC: 3.6 G/DL (ref 3.4–5)
ALP SERPL-CCNC: 78 U/L (ref 33–136)
ALT SERPL W P-5'-P-CCNC: 8 U/L (ref 7–45)
ANION GAP SERPL CALC-SCNC: 17 MMOL/L (ref 10–20)
AST SERPL W P-5'-P-CCNC: 17 U/L (ref 9–39)
BASOPHILS # BLD AUTO: 0.03 X10*3/UL (ref 0–0.1)
BASOPHILS NFR BLD AUTO: 0.6 %
BILIRUB DIRECT SERPL-MCNC: 0.1 MG/DL (ref 0–0.3)
BILIRUB SERPL-MCNC: 0.4 MG/DL (ref 0–1.2)
BUN SERPL-MCNC: 5 MG/DL (ref 6–23)
CALCIUM SERPL-MCNC: 9.6 MG/DL (ref 8.6–10.6)
CHLORIDE SERPL-SCNC: 100 MMOL/L (ref 98–107)
CO2 SERPL-SCNC: 26 MMOL/L (ref 21–32)
CREAT SERPL-MCNC: 0.52 MG/DL (ref 0.5–1.05)
EGFRCR SERPLBLD CKD-EPI 2021: >90 ML/MIN/1.73M*2
EOSINOPHIL # BLD AUTO: 0.04 X10*3/UL (ref 0–0.7)
EOSINOPHIL NFR BLD AUTO: 0.8 %
ERYTHROCYTE [DISTWIDTH] IN BLOOD BY AUTOMATED COUNT: 14.3 % (ref 11.5–14.5)
GLUCOSE SERPL-MCNC: 71 MG/DL (ref 74–99)
HCT VFR BLD AUTO: 39.5 % (ref 36–46)
HGB BLD-MCNC: 12.9 G/DL (ref 12–16)
IMM GRANULOCYTES # BLD AUTO: 0.01 X10*3/UL (ref 0–0.7)
IMM GRANULOCYTES NFR BLD AUTO: 0.2 % (ref 0–0.9)
LYMPHOCYTES # BLD AUTO: 1.25 X10*3/UL (ref 1.2–4.8)
LYMPHOCYTES NFR BLD AUTO: 24.7 %
MAGNESIUM SERPL-MCNC: 2.05 MG/DL (ref 1.6–2.4)
MCH RBC QN AUTO: 33.9 PG (ref 26–34)
MCHC RBC AUTO-ENTMCNC: 32.7 G/DL (ref 32–36)
MCV RBC AUTO: 104 FL (ref 80–100)
MONOCYTES # BLD AUTO: 0.41 X10*3/UL (ref 0.1–1)
MONOCYTES NFR BLD AUTO: 8.1 %
NEUTROPHILS # BLD AUTO: 3.33 X10*3/UL (ref 1.2–7.7)
NEUTROPHILS NFR BLD AUTO: 65.6 %
NRBC BLD-RTO: 0 /100 WBCS (ref 0–0)
PHOSPHATE SERPL-MCNC: 3.6 MG/DL (ref 2.5–4.9)
PLATELET # BLD AUTO: 254 X10*3/UL (ref 150–450)
POTASSIUM SERPL-SCNC: 4.6 MMOL/L (ref 3.5–5.3)
PROT SERPL-MCNC: 6.5 G/DL (ref 6.4–8.2)
RBC # BLD AUTO: 3.81 X10*6/UL (ref 4–5.2)
SODIUM SERPL-SCNC: 138 MMOL/L (ref 136–145)
WBC # BLD AUTO: 5.1 X10*3/UL (ref 4.4–11.3)

## 2024-01-19 PROCEDURE — 84100 ASSAY OF PHOSPHORUS: CPT

## 2024-01-19 PROCEDURE — C1769 GUIDE WIRE: HCPCS

## 2024-01-19 PROCEDURE — 85025 COMPLETE CBC W/AUTO DIFF WBC: CPT

## 2024-01-19 PROCEDURE — 43261 ENDO CHOLANGIOPANCREATOGRAPH: CPT | Performed by: INTERNAL MEDICINE

## 2024-01-19 PROCEDURE — 3700000001 HC GENERAL ANESTHESIA TIME - INITIAL BASE CHARGE

## 2024-01-19 PROCEDURE — 1210000001 HC SEMI-PRIVATE ROOM DAILY

## 2024-01-19 PROCEDURE — 2500000005 HC RX 250 GENERAL PHARMACY W/O HCPCS: Performed by: NURSE ANESTHETIST, CERTIFIED REGISTERED

## 2024-01-19 PROCEDURE — 76705 ECHO EXAM OF ABDOMEN: CPT | Performed by: RADIOLOGY

## 2024-01-19 PROCEDURE — P9045 ALBUMIN (HUMAN), 5%, 250 ML: HCPCS | Mod: JZ | Performed by: NURSE ANESTHETIST, CERTIFIED REGISTERED

## 2024-01-19 PROCEDURE — 3700000002 HC GENERAL ANESTHESIA TIME - EACH INCREMENTAL 1 MINUTE

## 2024-01-19 PROCEDURE — A43262 PR ERCP,SPHINCTEROTOMY: Performed by: NURSE ANESTHETIST, CERTIFIED REGISTERED

## 2024-01-19 PROCEDURE — 83735 ASSAY OF MAGNESIUM: CPT

## 2024-01-19 PROCEDURE — 82248 BILIRUBIN DIRECT: CPT

## 2024-01-19 PROCEDURE — 2500000004 HC RX 250 GENERAL PHARMACY W/ HCPCS (ALT 636 FOR OP/ED)

## 2024-01-19 PROCEDURE — 0FJB8ZZ INSPECTION OF HEPATOBILIARY DUCT, VIA NATURAL OR ARTIFICIAL OPENING ENDOSCOPIC: ICD-10-PCS | Performed by: INTERNAL MEDICINE

## 2024-01-19 PROCEDURE — 7100000010 HC PHASE TWO TIME - EACH INCREMENTAL 1 MINUTE

## 2024-01-19 PROCEDURE — 99233 SBSQ HOSP IP/OBS HIGH 50: CPT

## 2024-01-19 PROCEDURE — 7100000001 HC RECOVERY ROOM TIME - INITIAL BASE CHARGE

## 2024-01-19 PROCEDURE — 7100000002 HC RECOVERY ROOM TIME - EACH INCREMENTAL 1 MINUTE

## 2024-01-19 PROCEDURE — 2500000001 HC RX 250 WO HCPCS SELF ADMINISTERED DRUGS (ALT 637 FOR MEDICARE OP)

## 2024-01-19 PROCEDURE — 36415 COLL VENOUS BLD VENIPUNCTURE: CPT

## 2024-01-19 PROCEDURE — 2720000007 HC OR 272 NO HCPCS

## 2024-01-19 PROCEDURE — 2500000004 HC RX 250 GENERAL PHARMACY W/ HCPCS (ALT 636 FOR OP/ED): Performed by: NURSE ANESTHETIST, CERTIFIED REGISTERED

## 2024-01-19 PROCEDURE — A43262 PR ERCP,SPHINCTEROTOMY: Performed by: ANESTHESIOLOGY

## 2024-01-19 PROCEDURE — 2500000004 HC RX 250 GENERAL PHARMACY W/ HCPCS (ALT 636 FOR OP/ED): Performed by: ANESTHESIOLOGY

## 2024-01-19 PROCEDURE — 7100000009 HC PHASE TWO TIME - INITIAL BASE CHARGE

## 2024-01-19 RX ORDER — SODIUM CHLORIDE, SODIUM LACTATE, POTASSIUM CHLORIDE, CALCIUM CHLORIDE 600; 310; 30; 20 MG/100ML; MG/100ML; MG/100ML; MG/100ML
100 INJECTION, SOLUTION INTRAVENOUS CONTINUOUS
Status: DISCONTINUED | OUTPATIENT
Start: 2024-01-19 | End: 2024-01-19

## 2024-01-19 RX ORDER — PROPOFOL 10 MG/ML
INJECTION, EMULSION INTRAVENOUS AS NEEDED
Status: DISCONTINUED | OUTPATIENT
Start: 2024-01-19 | End: 2024-01-19

## 2024-01-19 RX ORDER — LIDOCAINE HCL/PF 100 MG/5ML
SYRINGE (ML) INTRAVENOUS AS NEEDED
Status: DISCONTINUED | OUTPATIENT
Start: 2024-01-19 | End: 2024-01-19

## 2024-01-19 RX ORDER — LIDOCAINE HYDROCHLORIDE 10 MG/ML
0.1 INJECTION INFILTRATION; PERINEURAL ONCE
Status: DISCONTINUED | OUTPATIENT
Start: 2024-01-19 | End: 2024-01-19

## 2024-01-19 RX ORDER — DEXAMETHASONE SODIUM PHOSPHATE 4 MG/ML
INJECTION, SOLUTION INTRA-ARTICULAR; INTRALESIONAL; INTRAMUSCULAR; INTRAVENOUS; SOFT TISSUE AS NEEDED
Status: DISCONTINUED | OUTPATIENT
Start: 2024-01-19 | End: 2024-01-19

## 2024-01-19 RX ORDER — ONDANSETRON HYDROCHLORIDE 2 MG/ML
4 INJECTION, SOLUTION INTRAVENOUS ONCE AS NEEDED
Status: DISCONTINUED | OUTPATIENT
Start: 2024-01-19 | End: 2024-01-19

## 2024-01-19 RX ORDER — HYDROMORPHONE HYDROCHLORIDE 1 MG/ML
0.2 INJECTION, SOLUTION INTRAMUSCULAR; INTRAVENOUS; SUBCUTANEOUS ONCE
Status: COMPLETED | OUTPATIENT
Start: 2024-01-19 | End: 2024-01-19

## 2024-01-19 RX ORDER — FENTANYL CITRATE 50 UG/ML
INJECTION, SOLUTION INTRAMUSCULAR; INTRAVENOUS AS NEEDED
Status: DISCONTINUED | OUTPATIENT
Start: 2024-01-19 | End: 2024-01-19

## 2024-01-19 RX ORDER — ROCURONIUM BROMIDE 10 MG/ML
INJECTION, SOLUTION INTRAVENOUS AS NEEDED
Status: DISCONTINUED | OUTPATIENT
Start: 2024-01-19 | End: 2024-01-19

## 2024-01-19 RX ORDER — ONDANSETRON HYDROCHLORIDE 2 MG/ML
INJECTION, SOLUTION INTRAVENOUS AS NEEDED
Status: DISCONTINUED | OUTPATIENT
Start: 2024-01-19 | End: 2024-01-19

## 2024-01-19 RX ORDER — OXYCODONE HYDROCHLORIDE 5 MG/1
5 TABLET ORAL EVERY 6 HOURS PRN
Status: DISCONTINUED | OUTPATIENT
Start: 2024-01-19 | End: 2024-01-20

## 2024-01-19 RX ORDER — PHENYLEPHRINE HCL IN 0.9% NACL 0.4MG/10ML
SYRINGE (ML) INTRAVENOUS AS NEEDED
Status: DISCONTINUED | OUTPATIENT
Start: 2024-01-19 | End: 2024-01-19

## 2024-01-19 RX ORDER — MIDAZOLAM HYDROCHLORIDE 1 MG/ML
INJECTION INTRAMUSCULAR; INTRAVENOUS AS NEEDED
Status: DISCONTINUED | OUTPATIENT
Start: 2024-01-19 | End: 2024-01-19

## 2024-01-19 RX ORDER — LABETALOL HYDROCHLORIDE 5 MG/ML
INJECTION, SOLUTION INTRAVENOUS AS NEEDED
Status: DISCONTINUED | OUTPATIENT
Start: 2024-01-19 | End: 2024-01-19

## 2024-01-19 RX ORDER — ALBUMIN HUMAN 50 G/1000ML
SOLUTION INTRAVENOUS AS NEEDED
Status: DISCONTINUED | OUTPATIENT
Start: 2024-01-19 | End: 2024-01-19

## 2024-01-19 RX ADMIN — HYDROMORPHONE HYDROCHLORIDE 0.2 MG: 1 INJECTION, SOLUTION INTRAMUSCULAR; INTRAVENOUS; SUBCUTANEOUS at 11:00

## 2024-01-19 RX ADMIN — ALBUMIN HUMAN 250 ML: 0.05 INJECTION, SOLUTION INTRAVENOUS at 11:47

## 2024-01-19 RX ADMIN — BUPROPION HYDROCHLORIDE 150 MG: 150 TABLET, FILM COATED, EXTENDED RELEASE ORAL at 07:53

## 2024-01-19 RX ADMIN — FENTANYL CITRATE 25 MCG: 50 INJECTION, SOLUTION INTRAMUSCULAR; INTRAVENOUS at 12:40

## 2024-01-19 RX ADMIN — PROPOFOL 20 MG: 10 INJECTION, EMULSION INTRAVENOUS at 12:03

## 2024-01-19 RX ADMIN — OXYCODONE HYDROCHLORIDE 5 MG: 5 TABLET ORAL at 07:53

## 2024-01-19 RX ADMIN — ROCURONIUM BROMIDE 50 MG: 10 INJECTION INTRAVENOUS at 11:41

## 2024-01-19 RX ADMIN — DEXAMETHASONE SODIUM PHOSPHATE 4 MG: 4 INJECTION, SOLUTION INTRA-ARTICULAR; INTRALESIONAL; INTRAMUSCULAR; INTRAVENOUS; SOFT TISSUE at 11:46

## 2024-01-19 RX ADMIN — MELATONIN 6 MG: 3 TAB ORAL at 20:14

## 2024-01-19 RX ADMIN — FENTANYL CITRATE 50 MCG: 50 INJECTION, SOLUTION INTRAMUSCULAR; INTRAVENOUS at 12:18

## 2024-01-19 RX ADMIN — Medication 120 MCG: at 11:47

## 2024-01-19 RX ADMIN — PROPOFOL 30 MG: 10 INJECTION, EMULSION INTRAVENOUS at 12:13

## 2024-01-19 RX ADMIN — SUGAMMADEX 200 MG: 100 INJECTION, SOLUTION INTRAVENOUS at 13:02

## 2024-01-19 RX ADMIN — OXYCODONE HYDROCHLORIDE 5 MG: 5 TABLET ORAL at 14:59

## 2024-01-19 RX ADMIN — ARIPIPRAZOLE 2 MG: 2 TABLET ORAL at 20:14

## 2024-01-19 RX ADMIN — LIDOCAINE HYDROCHLORIDE 40 MG: 20 INJECTION INTRAVENOUS at 11:40

## 2024-01-19 RX ADMIN — SERTRALINE HYDROCHLORIDE 100 MG: 100 TABLET ORAL at 07:53

## 2024-01-19 RX ADMIN — LABETALOL HYDROCHLORIDE 5 MG: 5 INJECTION INTRAVENOUS at 13:12

## 2024-01-19 RX ADMIN — Medication 120 MCG: at 11:52

## 2024-01-19 RX ADMIN — TRAZODONE HYDROCHLORIDE 100 MG: 50 TABLET ORAL at 22:53

## 2024-01-19 RX ADMIN — THIAMINE HCL TAB 100 MG 100 MG: 100 TAB at 07:53

## 2024-01-19 RX ADMIN — FENTANYL CITRATE 25 MCG: 50 INJECTION, SOLUTION INTRAMUSCULAR; INTRAVENOUS at 12:14

## 2024-01-19 RX ADMIN — ACETAMINOPHEN 650 MG: 325 TABLET ORAL at 00:01

## 2024-01-19 RX ADMIN — PROPOFOL 20 MG: 10 INJECTION, EMULSION INTRAVENOUS at 12:00

## 2024-01-19 RX ADMIN — ONDANSETRON 4 MG: 2 INJECTION INTRAMUSCULAR; INTRAVENOUS at 12:58

## 2024-01-19 RX ADMIN — OXYCODONE HYDROCHLORIDE 5 MG: 5 TABLET ORAL at 20:15

## 2024-01-19 RX ADMIN — PROPOFOL 130 MG: 10 INJECTION, EMULSION INTRAVENOUS at 11:40

## 2024-01-19 RX ADMIN — MIDAZOLAM HYDROCHLORIDE 1 MG: 1 INJECTION, SOLUTION INTRAMUSCULAR; INTRAVENOUS at 11:35

## 2024-01-19 ASSESSMENT — COGNITIVE AND FUNCTIONAL STATUS - GENERAL
MOBILITY SCORE: 24
MOBILITY SCORE: 22
DAILY ACTIVITIY SCORE: 24
DAILY ACTIVITIY SCORE: 24
CLIMB 3 TO 5 STEPS WITH RAILING: A LOT
MOBILITY SCORE: 24
DAILY ACTIVITIY SCORE: 24

## 2024-01-19 ASSESSMENT — PAIN DESCRIPTION - DESCRIPTORS: DESCRIPTORS: OTHER (COMMENT)

## 2024-01-19 ASSESSMENT — PAIN SCALES - GENERAL
PAINLEVEL_OUTOF10: 0 - NO PAIN
PAINLEVEL_OUTOF10: 8
PAINLEVEL_OUTOF10: 0 - NO PAIN
PAINLEVEL_OUTOF10: 6
PAINLEVEL_OUTOF10: 9
PAINLEVEL_OUTOF10: 0 - NO PAIN
PAINLEVEL_OUTOF10: 10 - WORST POSSIBLE PAIN
PAINLEVEL_OUTOF10: 7
PAINLEVEL_OUTOF10: 8
PAINLEVEL_OUTOF10: 0 - NO PAIN
PAINLEVEL_OUTOF10: 5 - MODERATE PAIN
PAINLEVEL_OUTOF10: 10 - WORST POSSIBLE PAIN

## 2024-01-19 ASSESSMENT — PAIN DESCRIPTION - LOCATION
LOCATION: BACK
LOCATION: BACK

## 2024-01-19 ASSESSMENT — PAIN DESCRIPTION - ORIENTATION
ORIENTATION: LOWER
ORIENTATION: LOWER

## 2024-01-19 NOTE — PROGRESS NOTES
"Cece Massey is a 68 y.o. female on day 1 of admission presenting with Choledocholithiasis.    Subjective   NAEON. Pt reported increased abd pain this morning, required oxy PRN. Was able to tolerate liquids after ERCP and would like to advance to a regular diet. She had a small well-formed, non-bloody bowel movement this morning.        Objective     Physical Exam  Constitutional:       General: She is not in acute distress.     Appearance: Normal appearance. She is not ill-appearing or toxic-appearing.   HENT:      Head: Normocephalic and atraumatic.   Eyes:      Conjunctiva/sclera: Conjunctivae normal.   Cardiovascular:      Rate and Rhythm: Normal rate and regular rhythm.      Pulses: Normal pulses.      Heart sounds: Normal heart sounds.   Pulmonary:      Effort: Pulmonary effort is normal.   Abdominal:      General: Abdomen is flat. Bowel sounds are normal. There is no distension.      Palpations: Abdomen is soft.      Comments: Tender in epigastric area   Musculoskeletal:         General: No swelling.      Right lower leg: No edema.      Left lower leg: No edema.   Skin:     Capillary Refill: Capillary refill takes 2 to 3 seconds.   Neurological:      General: No focal deficit present.      Mental Status: She is alert and oriented to person, place, and time. Mental status is at baseline.   Psychiatric:         Mood and Affect: Mood normal.         Behavior: Behavior normal.         Last Recorded Vitals  Blood pressure (!) 163/109, pulse 88, temperature 36.1 °C (97 °F), resp. rate 20, height 1.676 m (5' 6\"), weight 50.8 kg (112 lb), SpO2 96 %.  Intake/Output last 3 Shifts:  No intake/output data recorded.    Relevant Results  Results for orders placed or performed during the hospital encounter of 01/18/24 (from the past 24 hour(s))   CBC and Auto Differential   Result Value Ref Range    WBC 5.1 4.4 - 11.3 x10*3/uL    nRBC 0.0 0.0 - 0.0 /100 WBCs    RBC 3.81 (L) 4.00 - 5.20 x10*6/uL    Hemoglobin 12.9 12.0 - 16.0 " g/dL    Hematocrit 39.5 36.0 - 46.0 %     (H) 80 - 100 fL    MCH 33.9 26.0 - 34.0 pg    MCHC 32.7 32.0 - 36.0 g/dL    RDW 14.3 11.5 - 14.5 %    Platelets 254 150 - 450 x10*3/uL    Neutrophils % 65.6 40.0 - 80.0 %    Immature Granulocytes %, Automated 0.2 0.0 - 0.9 %    Lymphocytes % 24.7 13.0 - 44.0 %    Monocytes % 8.1 2.0 - 10.0 %    Eosinophils % 0.8 0.0 - 6.0 %    Basophils % 0.6 0.0 - 2.0 %    Neutrophils Absolute 3.33 1.20 - 7.70 x10*3/uL    Immature Granulocytes Absolute, Automated 0.01 0.00 - 0.70 x10*3/uL    Lymphocytes Absolute 1.25 1.20 - 4.80 x10*3/uL    Monocytes Absolute 0.41 0.10 - 1.00 x10*3/uL    Eosinophils Absolute 0.04 0.00 - 0.70 x10*3/uL    Basophils Absolute 0.03 0.00 - 0.10 x10*3/uL   Magnesium   Result Value Ref Range    Magnesium 2.05 1.60 - 2.40 mg/dL   Hepatic Function Panel   Result Value Ref Range    Albumin 3.6 3.4 - 5.0 g/dL    Bilirubin, Total 0.4 0.0 - 1.2 mg/dL    Bilirubin, Direct 0.1 0.0 - 0.3 mg/dL    Alkaline Phosphatase 78 33 - 136 U/L    ALT 8 7 - 45 U/L    AST 17 9 - 39 U/L    Total Protein 6.5 6.4 - 8.2 g/dL   Phosphorus   Result Value Ref Range    Phosphorus 3.6 2.5 - 4.9 mg/dL   Basic Metabolic Panel   Result Value Ref Range    Glucose 71 (L) 74 - 99 mg/dL    Sodium 138 136 - 145 mmol/L    Potassium 4.6 3.5 - 5.3 mmol/L    Chloride 100 98 - 107 mmol/L    Bicarbonate 26 21 - 32 mmol/L    Anion Gap 17 10 - 20 mmol/L    Urea Nitrogen 5 (L) 6 - 23 mg/dL    Creatinine 0.52 0.50 - 1.05 mg/dL    eGFR >90 >60 mL/min/1.73m*2    Calcium 9.6 8.6 - 10.6 mg/dL                 Assessment/Plan   Principal Problem:    Choledocholithiasis      Ms. Massey is a 70 yo F with h/o Bilroth II for perforated gastric ulcer, cholecystectomy, tobacco use disorder, chronic back pain, anxiety and depression who presented to an OSH with n/v, abd pain with labs showing lipase >800, consistent with acute pancreatitis, transferred to [facility] for possible ERCP given findings of possible  choledocholithiasis on MRI/MRCP.       Updates 1/19:  - ERCP done today, was difficult to maneuver given complex anatomy s/p Billroth II. Given clear bile noted, and lack of ampullary mass, in setting of normal LFTs, the decision was made to abort further attempts.       #Acute pancreatitis  #Pancreatic duct dilation  :: Patient presented with n/v, abd pain, lipase 880 at OSH. Received supportive care, did not tolerate full liquid diet  :: Etiologies include etoh, gallstones given history and imaging. Patient denies trauma, TGs and calcium wnl, not on steroids  :: MRI/MRCP at OSH(1/16): Apparent 5 mm ovoid filling defect in the distal periampullary common bile duct on coronal sequences with abrupt concave cut off/meniscal sign present on MRCP sequences, suggesting possible choledocholithiasis. Dilatation of the main pancreatic duct measuring up to at least 4 to 5 mm at the level of the pancreatic body and tail   :: ERCP (1/19): Difficult to maneuver given complex anatomy s/p Bilroth II. Given clear bile noted, and lack of ampullary mass, in setting of normal LFTs, the decision was made to abort further attempts.   - Follow up PEth  - Start low fat diet  - Tylenol 650mg q4h PRN (moderate), oxy 5mg q6h PRN (Severe)      #Pancreatic cysts  :: MRI/MRCP at OSH (1/16): Several small pancreatic cystic lesions measuring up to 10 x 6 mm at the pancreatic tail. Additional smaller lesions (measuring up to 6 x 6 mm) and punctate cystic lesions towards the pancreatic body and head/uncinate. These may potentially communicate with the main pancreatic duct.   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  - Daily B12 supplementation        #Anxiety  #Depression  -continue aripiprazole 2mg QHS   -continue trazodone 100mg  QHS  -continue melatonin 6mg QHS   -continue sertraline 100mg daily   -continue bupropion 150mg XL daily   -continue thiamine 100mg daily        #Tobacco use disorder  :: Patient declined nicotine replacement      F: PRN  E: PRN  N: Clear liquid diet  A: PIVs  DVT: Lovenox   GI: Pantoprazole 40mg daily (continued from OSH)    SDM:  Earl: [phone]  Code Status: Full code (does not want to be machine dependent for a prolonged time)    Patient seen and staffed with attending physician.  Janie Gee MD  Internal Medicine, PGY-1

## 2024-01-19 NOTE — ANESTHESIA POSTPROCEDURE EVALUATION
Patient: Cece Massey    Procedure Summary       Date: 01/19/24 Room / Location: [facility]    Anesthesia Start: 1125 Anesthesia Stop: 1315    Procedure: ERCP Diagnosis: Choledocholithiasis    Scheduled Providers: Victor M Lopez MD; Tabatha Blum MD; Rhonda Chiang RN Responsible Provider: CONNOR Hernandez    Anesthesia Type: general ASA Status: 3            Anesthesia Type: general    Anesthesia Post Evaluation    Patient location during evaluation: PACU  Patient participation: complete - patient participated  Level of consciousness: awake  Pain management: adequate  Airway patency: patent  Cardiovascular status: acceptable  Respiratory status: acceptable  Hydration status: acceptable  Postoperative Nausea and Vomiting: none        There were no known notable events for this encounter.

## 2024-01-19 NOTE — ANESTHESIA PREPROCEDURE EVALUATION
Patient: Cece Massey    Procedure Information       Date/Time: 01/19/24 1100    Scheduled providers: Victor M Lopez MD; Tabatha Blum MD; Rhonda Chiang RN    Procedure: ERCP    Location: [facility]           70 yo current smoker with a PMHx/PSHx significant for Bilroth II for perforated gastric ulcer, GJ ulcers, labeled to have chronic pancreatitis, chronic lower back pain s/p 3 surgeries, who presented to [facility] for abdominal pain, found to have pancreatitis and was transferred to [facility] for potential ERCP   Relevant Problems   Anesthesia (within normal limits)  No family hx      Cardiovascular (within normal limits)      Endocrine (within normal limits)      GI  Gallstone obstruction      /Renal (within normal limits)      Neuro/Psych (within normal limits)      Pulmonary (within normal limits)      GI/Hepatic   (+) Choledocholithiasis      Hematology (within normal limits)      Musculoskeletal (within normal limits)      Eyes, Ears, Nose, and Throat (within normal limits)      Infectious Disease (within normal limits)       Clinical information reviewed:    Allergies                NPO Detail:  NPO/Void Status  Date of Last Liquid: 01/18/24  Time of Last Liquid: 1900  Date of Last Solid: 01/12/24         Physical Exam    Airway  Mallampati: unable to assess  TM distance: >3 FB  Neck ROM: full     Cardiovascular - normal exam     Dental   Comments: intact   Pulmonary - normal exam     Abdominal            Anesthesia Plan    History of general anesthesia?: yes  History of complications of general anesthesia?: no    ASA 3     general     intravenous induction   Anesthetic plan and risks discussed with patient.  Use of blood products discussed with patient who consented to blood products.    Plan discussed with CRNA.

## 2024-01-19 NOTE — ANESTHESIA PROCEDURE NOTES
Airway  Date/Time: 1/19/2024 11:44 AM  Urgency: elective    Airway not difficult    Staffing  Performed: CRNA   Authorized by: CONNOR Hernandez    Performed by: CONNOR Hernandez  Patient location during procedure: OR    Indications and Patient Condition  Indications for airway management: anesthesia and airway protection  Spontaneous ventilation: present  Sedation level: deep  Preoxygenated: yes  Patient position: sniffing  Mask difficulty assessment: 1 - vent by mask  Planned trial extubation    Final Airway Details  Final airway type: endotracheal airway      Successful airway: ETT     Successful intubation technique: direct laryngoscopy  Blade: Susan  Blade size: #4  ETT size (mm): 6.5  Cormack-Lehane Classification: grade I - full view of glottis  Placement verified by: chest auscultation, capnometry and palpation of cuff   Measured from: lips  ETT to lips (cm): 21  Number of attempts at approach: 1    Additional Comments  Lips and teeth in preinduction condition

## 2024-01-20 LAB
ALBUMIN SERPL BCP-MCNC: 3.5 G/DL (ref 3.4–5)
ALP SERPL-CCNC: 69 U/L (ref 33–136)
ALT SERPL W P-5'-P-CCNC: 9 U/L (ref 7–45)
ANION GAP SERPL CALC-SCNC: 14 MMOL/L (ref 10–20)
AST SERPL W P-5'-P-CCNC: 15 U/L (ref 9–39)
BASOPHILS # BLD AUTO: 0.02 X10*3/UL (ref 0–0.1)
BASOPHILS NFR BLD AUTO: 0.3 %
BILIRUB DIRECT SERPL-MCNC: 0 MG/DL (ref 0–0.3)
BILIRUB SERPL-MCNC: 0.4 MG/DL (ref 0–1.2)
BUN SERPL-MCNC: 10 MG/DL (ref 6–23)
CALCIUM SERPL-MCNC: 9.5 MG/DL (ref 8.6–10.6)
CHLORIDE SERPL-SCNC: 98 MMOL/L (ref 98–107)
CO2 SERPL-SCNC: 31 MMOL/L (ref 21–32)
CREAT SERPL-MCNC: 0.77 MG/DL (ref 0.5–1.05)
EGFRCR SERPLBLD CKD-EPI 2021: 85 ML/MIN/1.73M*2
EOSINOPHIL # BLD AUTO: 0.03 X10*3/UL (ref 0–0.7)
EOSINOPHIL NFR BLD AUTO: 0.4 %
ERYTHROCYTE [DISTWIDTH] IN BLOOD BY AUTOMATED COUNT: 14.6 % (ref 11.5–14.5)
GLUCOSE SERPL-MCNC: 102 MG/DL (ref 74–99)
HCT VFR BLD AUTO: 42 % (ref 36–46)
HGB BLD-MCNC: 13.2 G/DL (ref 12–16)
IMM GRANULOCYTES # BLD AUTO: 0.02 X10*3/UL (ref 0–0.7)
IMM GRANULOCYTES NFR BLD AUTO: 0.3 % (ref 0–0.9)
LYMPHOCYTES # BLD AUTO: 1.56 X10*3/UL (ref 1.2–4.8)
LYMPHOCYTES NFR BLD AUTO: 22.3 %
MAGNESIUM SERPL-MCNC: 1.75 MG/DL (ref 1.6–2.4)
MCH RBC QN AUTO: 34.7 PG (ref 26–34)
MCHC RBC AUTO-ENTMCNC: 31.4 G/DL (ref 32–36)
MCV RBC AUTO: 111 FL (ref 80–100)
MONOCYTES # BLD AUTO: 0.62 X10*3/UL (ref 0.1–1)
MONOCYTES NFR BLD AUTO: 8.9 %
NEUTROPHILS # BLD AUTO: 4.75 X10*3/UL (ref 1.2–7.7)
NEUTROPHILS NFR BLD AUTO: 67.8 %
NRBC BLD-RTO: 0 /100 WBCS (ref 0–0)
PHOSPHATE SERPL-MCNC: 4.4 MG/DL (ref 2.5–4.9)
PLATELET # BLD AUTO: 260 X10*3/UL (ref 150–450)
POTASSIUM SERPL-SCNC: 4.2 MMOL/L (ref 3.5–5.3)
PROT SERPL-MCNC: 6.3 G/DL (ref 6.4–8.2)
RBC # BLD AUTO: 3.8 X10*6/UL (ref 4–5.2)
SODIUM SERPL-SCNC: 139 MMOL/L (ref 136–145)
WBC # BLD AUTO: 7 X10*3/UL (ref 4.4–11.3)

## 2024-01-20 PROCEDURE — 2500000004 HC RX 250 GENERAL PHARMACY W/ HCPCS (ALT 636 FOR OP/ED)

## 2024-01-20 PROCEDURE — 2500000001 HC RX 250 WO HCPCS SELF ADMINISTERED DRUGS (ALT 637 FOR MEDICARE OP)

## 2024-01-20 PROCEDURE — 85025 COMPLETE CBC W/AUTO DIFF WBC: CPT

## 2024-01-20 PROCEDURE — 2500000001 HC RX 250 WO HCPCS SELF ADMINISTERED DRUGS (ALT 637 FOR MEDICARE OP): Performed by: STUDENT IN AN ORGANIZED HEALTH CARE EDUCATION/TRAINING PROGRAM

## 2024-01-20 PROCEDURE — 2500000004 HC RX 250 GENERAL PHARMACY W/ HCPCS (ALT 636 FOR OP/ED): Performed by: STUDENT IN AN ORGANIZED HEALTH CARE EDUCATION/TRAINING PROGRAM

## 2024-01-20 PROCEDURE — 83735 ASSAY OF MAGNESIUM: CPT

## 2024-01-20 PROCEDURE — 99233 SBSQ HOSP IP/OBS HIGH 50: CPT | Performed by: STUDENT IN AN ORGANIZED HEALTH CARE EDUCATION/TRAINING PROGRAM

## 2024-01-20 PROCEDURE — 1210000001 HC SEMI-PRIVATE ROOM DAILY

## 2024-01-20 PROCEDURE — 36415 COLL VENOUS BLD VENIPUNCTURE: CPT

## 2024-01-20 PROCEDURE — 80048 BASIC METABOLIC PNL TOTAL CA: CPT

## 2024-01-20 PROCEDURE — 84075 ASSAY ALKALINE PHOSPHATASE: CPT

## 2024-01-20 PROCEDURE — 84100 ASSAY OF PHOSPHORUS: CPT

## 2024-01-20 RX ORDER — OXYCODONE HYDROCHLORIDE 5 MG/1
7.5 TABLET ORAL EVERY 6 HOURS PRN
Status: DISCONTINUED | OUTPATIENT
Start: 2024-01-20 | End: 2024-01-20

## 2024-01-20 RX ORDER — HYDROMORPHONE HYDROCHLORIDE 1 MG/ML
0.4 INJECTION, SOLUTION INTRAMUSCULAR; INTRAVENOUS; SUBCUTANEOUS ONCE
Status: COMPLETED | OUTPATIENT
Start: 2024-01-20 | End: 2024-01-20

## 2024-01-20 RX ORDER — HYDROMORPHONE HYDROCHLORIDE 1 MG/ML
0.2 INJECTION, SOLUTION INTRAMUSCULAR; INTRAVENOUS; SUBCUTANEOUS ONCE
Status: COMPLETED | OUTPATIENT
Start: 2024-01-20 | End: 2024-01-20

## 2024-01-20 RX ORDER — OXYCODONE HYDROCHLORIDE 5 MG/1
5 TABLET ORAL EVERY 6 HOURS PRN
Status: DISCONTINUED | OUTPATIENT
Start: 2024-01-20 | End: 2024-01-21

## 2024-01-20 RX ORDER — DICYCLOMINE HYDROCHLORIDE 10 MG/1
10 CAPSULE ORAL 4 TIMES DAILY
Status: DISCONTINUED | OUTPATIENT
Start: 2024-01-20 | End: 2024-01-26 | Stop reason: HOSPADM

## 2024-01-20 RX ADMIN — HYDROMORPHONE HYDROCHLORIDE 0.4 MG: 1 INJECTION, SOLUTION INTRAMUSCULAR; INTRAVENOUS; SUBCUTANEOUS at 20:36

## 2024-01-20 RX ADMIN — ENOXAPARIN SODIUM 40 MG: 100 INJECTION SUBCUTANEOUS at 12:34

## 2024-01-20 RX ADMIN — BUPROPION HYDROCHLORIDE 150 MG: 150 TABLET, FILM COATED, EXTENDED RELEASE ORAL at 08:13

## 2024-01-20 RX ADMIN — OXYCODONE HYDROCHLORIDE 7.5 MG: 5 TABLET ORAL at 16:58

## 2024-01-20 RX ADMIN — SERTRALINE HYDROCHLORIDE 100 MG: 100 TABLET ORAL at 08:13

## 2024-01-20 RX ADMIN — SODIUM CHLORIDE, POTASSIUM CHLORIDE, SODIUM LACTATE AND CALCIUM CHLORIDE 500 ML: 600; 310; 30; 20 INJECTION, SOLUTION INTRAVENOUS at 21:34

## 2024-01-20 RX ADMIN — OXYCODONE HYDROCHLORIDE 5 MG: 5 TABLET ORAL at 02:59

## 2024-01-20 RX ADMIN — OXYCODONE HYDROCHLORIDE 5 MG: 5 TABLET ORAL at 10:39

## 2024-01-20 RX ADMIN — OXYCODONE HYDROCHLORIDE 7.5 MG: 5 TABLET ORAL at 23:09

## 2024-01-20 RX ADMIN — THIAMINE HCL TAB 100 MG 100 MG: 100 TAB at 08:13

## 2024-01-20 RX ADMIN — ARIPIPRAZOLE 2 MG: 2 TABLET ORAL at 20:32

## 2024-01-20 RX ADMIN — TRAZODONE HYDROCHLORIDE 100 MG: 50 TABLET ORAL at 20:32

## 2024-01-20 RX ADMIN — DICYCLOMINE HYDROCHLORIDE 10 MG: 10 CAPSULE ORAL at 20:32

## 2024-01-20 RX ADMIN — MELATONIN 6 MG: 3 TAB ORAL at 20:32

## 2024-01-20 RX ADMIN — HYDROMORPHONE HYDROCHLORIDE 0.4 MG: 1 INJECTION, SOLUTION INTRAMUSCULAR; INTRAVENOUS; SUBCUTANEOUS at 08:20

## 2024-01-20 RX ADMIN — HYDROMORPHONE HYDROCHLORIDE 0.2 MG: 1 INJECTION, SOLUTION INTRAMUSCULAR; INTRAVENOUS; SUBCUTANEOUS at 14:51

## 2024-01-20 ASSESSMENT — COGNITIVE AND FUNCTIONAL STATUS - GENERAL
MOBILITY SCORE: 24
DAILY ACTIVITIY SCORE: 24

## 2024-01-20 ASSESSMENT — PAIN DESCRIPTION - LOCATION
LOCATION: ABDOMEN
LOCATION: ABDOMEN

## 2024-01-20 ASSESSMENT — PAIN - FUNCTIONAL ASSESSMENT
PAIN_FUNCTIONAL_ASSESSMENT: 0-10
PAIN_FUNCTIONAL_ASSESSMENT: 0-10

## 2024-01-20 ASSESSMENT — PAIN SCALES - GENERAL
PAINLEVEL_OUTOF10: 8
PAINLEVEL_OUTOF10: 7
PAINLEVEL_OUTOF10: 9
PAINLEVEL_OUTOF10: 7
PAINLEVEL_OUTOF10: 7

## 2024-01-20 NOTE — PROGRESS NOTES
"Cece Massey is a 68 y.o. female on day 2 of admission presenting with Choledocholithiasis.        Subjective   Ms Massey complaints today of epigastric pain that radiates to both sides of her back       Objective     Physical Exam    Physical Exam  Constitutional:       General: She is not in acute distress.     Appearance: Normal appearance. She is not ill-appearing or toxic-appearing.   HENT:      Head: Normocephalic and atraumatic.   Eyes:      Conjunctiva/sclera: Conjunctivae normal.   Cardiovascular:      Rate and Rhythm: Normal rate and regular rhythm.      Pulses: Normal pulses.      Heart sounds: Normal heart sounds.   Pulmonary:      Effort: Pulmonary effort is normal.   Abdominal:      General: Abdomen is flat. Bowel sounds are normal. There is no distension.      Palpations: Abdomen is soft.      Comments: Tender in epigastric area   Musculoskeletal:         General: No swelling.      Right lower leg: No edema.      Left lower leg: No edema.   Skin:     Capillary Refill: Capillary refill takes 2 to 3 seconds.   Neurological:      General: No focal deficit present.      Mental Status: She is alert and oriented to person, place, and time. Mental status is at baseline.   Psychiatric:         Mood and Affect: Mood normal.         Behavior: Behavior normal.                 Last Recorded Vitals  Blood pressure (!) 131/95, pulse 89, temperature 36.6 °C (97.9 °F), resp. rate 18, height 1.676 m (5' 6\"), weight 50.8 kg (112 lb), SpO2 94 %.  Intake/Output last 3 Shifts:  I/O last 3 completed shifts:  In: 240 (4.7 mL/kg) [P.O.:240]  Out: - (0 mL/kg)   Weight: 50.8 kg     Relevant Results      Scheduled medications  ARIPiprazole, 2 mg, oral, Nightly  buPROPion XL, 150 mg, oral, Daily  enoxaparin, 40 mg, subcutaneous, q24h  melatonin, 6 mg, oral, Nightly  polyethylene glycol, 17 g, oral, Nightly  sertraline, 100 mg, oral, Daily  [Held by provider] sucralfate, 1 g, oral, TID with meals  thiamine, 100 mg, oral, " Daily  traZODone, 100 mg, oral, Nightly        Results for orders placed or performed during the hospital encounter of 01/18/24 (from the past 24 hour(s))   CBC and Auto Differential   Result Value Ref Range    WBC 7.0 4.4 - 11.3 x10*3/uL    nRBC 0.0 0.0 - 0.0 /100 WBCs    RBC 3.80 (L) 4.00 - 5.20 x10*6/uL    Hemoglobin 13.2 12.0 - 16.0 g/dL    Hematocrit 42.0 36.0 - 46.0 %     (H) 80 - 100 fL    MCH 34.7 (H) 26.0 - 34.0 pg    MCHC 31.4 (L) 32.0 - 36.0 g/dL    RDW 14.6 (H) 11.5 - 14.5 %    Platelets 260 150 - 450 x10*3/uL    Neutrophils % 67.8 40.0 - 80.0 %    Immature Granulocytes %, Automated 0.3 0.0 - 0.9 %    Lymphocytes % 22.3 13.0 - 44.0 %    Monocytes % 8.9 2.0 - 10.0 %    Eosinophils % 0.4 0.0 - 6.0 %    Basophils % 0.3 0.0 - 2.0 %    Neutrophils Absolute 4.75 1.20 - 7.70 x10*3/uL    Immature Granulocytes Absolute, Automated 0.02 0.00 - 0.70 x10*3/uL    Lymphocytes Absolute 1.56 1.20 - 4.80 x10*3/uL    Monocytes Absolute 0.62 0.10 - 1.00 x10*3/uL    Eosinophils Absolute 0.03 0.00 - 0.70 x10*3/uL    Basophils Absolute 0.02 0.00 - 0.10 x10*3/uL   Magnesium   Result Value Ref Range    Magnesium 1.75 1.60 - 2.40 mg/dL   Hepatic Function Panel   Result Value Ref Range    Albumin 3.5 3.4 - 5.0 g/dL    Bilirubin, Total 0.4 0.0 - 1.2 mg/dL    Bilirubin, Direct 0.0 0.0 - 0.3 mg/dL    Alkaline Phosphatase 69 33 - 136 U/L    ALT 9 7 - 45 U/L    AST 15 9 - 39 U/L    Total Protein 6.3 (L) 6.4 - 8.2 g/dL   Phosphorus   Result Value Ref Range    Phosphorus 4.4 2.5 - 4.9 mg/dL   Basic Metabolic Panel   Result Value Ref Range    Glucose 102 (H) 74 - 99 mg/dL    Sodium 139 136 - 145 mmol/L    Potassium 4.2 3.5 - 5.3 mmol/L    Chloride 98 98 - 107 mmol/L    Bicarbonate 31 21 - 32 mmol/L    Anion Gap 14 10 - 20 mmol/L    Urea Nitrogen 10 6 - 23 mg/dL    Creatinine 0.77 0.50 - 1.05 mg/dL    eGFR 85 >60 mL/min/1.73m*2    Calcium 9.5 8.6 - 10.6 mg/dL                   Assessment/Plan   Principal Problem:     Choledocholithiasis    Ms. Massey is a 70 yo F with h/o Bilroth II for perforated gastric ulcer, cholecystectomy, tobacco use disorder, chronic back pain, anxiety and depression who presented to an OSH with n/v, abd pain with labs showing lipase >800, consistent with acute pancreatitis, transferred to [facility] for possible ERCP given findings of possible choledocholithiasis on MRI/MRCP. ERCP done 1/19, was difficult to maneuver given complex anatomy s/p Billroth II. Multiple attempts were made to cannulate the bile duct, but these attempts were unsuccessful.  Given clear bile noted, and lack of ampullary mass, in setting of normal LFTs, the decision was made to abort further attempts.      Updates    Patient placed on CLD due to increased pain  Will have discussion with advanced endoscopist for possible US  Will manage pain with oral medication      #Acute pancreatitis  #Pancreatic duct dilation  :: Patient presented with n/v, abd pain, lipase 880 at OSH. Received supportive care, did not tolerate full liquid diet  :: Etiologies include etoh, gallstones given history and imaging. Patient denies trauma, TGs and calcium wnl, not on steroids  :: MRI/MRCP at OSH(1/16): Apparent 5 mm ovoid filling defect in the distal periampullary common bile duct on coronal sequences with abrupt concave cut off/meniscal sign present on MRCP sequences, suggesting possible choledocholithiasis. Dilatation of the main pancreatic duct measuring up to at least 4 to 5 mm at the level of the pancreatic body and tail   :: ERCP (1/19): Difficult to maneuver given complex anatomy s/p Bilroth II. Given clear bile noted, and lack of ampullary mass, in setting of normal LFTs, the decision was made to abort further attempts.   - Follow up PEth  - Start low fat diet  - Tylenol 650mg q4h PRN (moderate), oxy 7.5mg q6h PRN (Severe)        #Pancreatic cysts  :: MRI/MRCP at OSH (1/16): Several small pancreatic cystic lesions measuring up to 10 x 6 mm at the  pancreatic tail. Additional smaller lesions (measuring up to 6 x 6 mm) and punctate cystic lesions towards the pancreatic body and head/uncinate. These may potentially communicate with the main pancreatic duct.   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  - Daily B12 supplementation        #Anxiety  #Depression  -continue aripiprazole 2mg QHS   -continue trazodone 100mg QHS  -continue melatonin 6mg QHS   -continue sertraline 100mg daily   -continue bupropion 150mg XL daily   -continue thiamine 100mg daily         #Tobacco use disorder  :: Patient declined nicotine replacement        F: PRN  E: PRN  N: Clear liquid diet  A: PIVs  DVT: Lovenox   GI: Pantoprazole 40mg daily (continued from OSH)     SDM:  Earl: [phone]  Code Status: Full code (does not want to be machine dependent for a prolonged time)                   Jean Story   PGY Medicine/Genetics  Patient seen and discussed with Dr.  Preferred communication Haiku messaging

## 2024-01-20 NOTE — CARE PLAN
Problem: Pain  Goal: My pain/discomfort is manageable  Outcome: Progressing     Problem: Safety  Goal: Patient will be injury free during hospitalization  Outcome: Progressing  Goal: I will remain free of falls  Outcome: Progressing     Problem: Daily Care  Goal: Daily care needs are met  Outcome: Progressing     Problem: Psychosocial Needs  Goal: Demonstrates ability to cope with hospitalization/illness  Outcome: Progressing  Goal: Collaborate with me, my family, and caregiver to identify my specific goals  Outcome: Progressing     Problem: Discharge Barriers  Goal: My discharge needs are met  Outcome: Progressing   The patient's goals for the shift include      The clinical goals for the shift include Patient will have tolerable pain from this shift and be HDS.

## 2024-01-20 NOTE — DISCHARGE INSTRUCTIONS
Dear Cece Massey,    You were admitted due to acute pancreatitis. At the outside hospital your MRI/MRCP (magnetic resonance cholangiopancreatography) was concerning for a possible gallstone which could have caused the acute pancreatitis. You were transferred to [facility] for potential ERCP (Endoscopic retrograde cholangiopancreatography) to remove the stone. Here you underwent the ERCP and no stone was seen. You were able to tolerate a regular diet, your pain was controlled and you were deemed stable for discharge.      If you experience fevers, nausea, vomiting, or bloody vomit, please go to the nearest ED.      Follow up:  -Chronic Pain outpatient clinic (they will call you)  -Primary care physician    It was a pleasure being part of your care.   Gastroenterology

## 2024-01-20 NOTE — HOSPITAL COURSE
Ms. Massey is a 68 yo F with h/o Bilroth II for perforated gastric ulcer, cholecystectomy, tobacco use disorder, chronic back pain, anxiety and depression who presented to an OSH with n/v, abd pain. At the OSH labs were significant for lipase >800, which along with her presentation were consistent with acute pancreatitis. CT A/P showed pancreatic duct abnormalities and multiple subcentimeter cystic lesions in the pancreas. MRI/MRCP showed biliary duct and common bile duct dilation and possible choledocholithiasis, as well as the multiple subcentimeter cystic lesions in the pancreas and pancreatic duct dilation as noted on the CT. She was transferred to [facility] for possible ERCP given findings of possible choledocholithiasis on MRI/MRCP. Here she underwent ERCP but it was difficult to maneuver given her anatomy s/p Billroth II. No mass was seen at the papilla as noted on MRI. Given clear bile was noted, and lack of ampullary mass, in setting of normal LFTs, the decision was made to abort further attempts. Was planned for percutaneous cholangiogram on 1-, however after further discussion with the IR team they did not believe ductal dilation seen on MRCP was due to ductal stenosis or obstruction and ultrasound prior to the procedure was not concerning for any significant ductal dilation.  Thus, the decision was made to cancel the IR guided cholangiogram. Peth results positive and indicative of: Heavy alcohol consumption over the past 2-4 weeks or chronic alcohol use   Pt continued to complain of severe abdominal pain (though unremarkable physical exam w/ no guarding on deep palpation) requiring frequent opiates, gabapentin, dycyclomine to be started, which did not adequately control her pain. She will follow-up with chronic pain outpatient.  She has tolerated regular diet for several days now and is medically stable for discharge.

## 2024-01-21 LAB
ALBUMIN SERPL BCP-MCNC: 3.6 G/DL (ref 3.4–5)
ALP SERPL-CCNC: 67 U/L (ref 33–136)
ALT SERPL W P-5'-P-CCNC: 6 U/L (ref 7–45)
ANION GAP SERPL CALC-SCNC: 19 MMOL/L (ref 10–20)
AST SERPL W P-5'-P-CCNC: 16 U/L (ref 9–39)
BASOPHILS # BLD AUTO: 0.03 X10*3/UL (ref 0–0.1)
BASOPHILS NFR BLD AUTO: 0.5 %
BILIRUB DIRECT SERPL-MCNC: 0 MG/DL (ref 0–0.3)
BILIRUB SERPL-MCNC: 0.3 MG/DL (ref 0–1.2)
BUN SERPL-MCNC: 8 MG/DL (ref 6–23)
CALCIUM SERPL-MCNC: 9.7 MG/DL (ref 8.6–10.6)
CHLORIDE SERPL-SCNC: 98 MMOL/L (ref 98–107)
CO2 SERPL-SCNC: 26 MMOL/L (ref 21–32)
CREAT SERPL-MCNC: 0.69 MG/DL (ref 0.5–1.05)
EGFRCR SERPLBLD CKD-EPI 2021: >90 ML/MIN/1.73M*2
EOSINOPHIL # BLD AUTO: 0.08 X10*3/UL (ref 0–0.7)
EOSINOPHIL NFR BLD AUTO: 1.3 %
ERYTHROCYTE [DISTWIDTH] IN BLOOD BY AUTOMATED COUNT: 14.3 % (ref 11.5–14.5)
GLUCOSE SERPL-MCNC: 86 MG/DL (ref 74–99)
HCT VFR BLD AUTO: 42.3 % (ref 36–46)
HGB BLD-MCNC: 13.2 G/DL (ref 12–16)
IMM GRANULOCYTES # BLD AUTO: 0.01 X10*3/UL (ref 0–0.7)
IMM GRANULOCYTES NFR BLD AUTO: 0.2 % (ref 0–0.9)
LYMPHOCYTES # BLD AUTO: 2.34 X10*3/UL (ref 1.2–4.8)
LYMPHOCYTES NFR BLD AUTO: 38.9 %
MAGNESIUM SERPL-MCNC: 1.87 MG/DL (ref 1.6–2.4)
MCH RBC QN AUTO: 33.4 PG (ref 26–34)
MCHC RBC AUTO-ENTMCNC: 31.2 G/DL (ref 32–36)
MCV RBC AUTO: 107 FL (ref 80–100)
MONOCYTES # BLD AUTO: 0.52 X10*3/UL (ref 0.1–1)
MONOCYTES NFR BLD AUTO: 8.7 %
NEUTROPHILS # BLD AUTO: 3.03 X10*3/UL (ref 1.2–7.7)
NEUTROPHILS NFR BLD AUTO: 50.4 %
NRBC BLD-RTO: 0 /100 WBCS (ref 0–0)
PHOSPHATE SERPL-MCNC: 4.4 MG/DL (ref 2.5–4.9)
PLATELET # BLD AUTO: 273 X10*3/UL (ref 150–450)
POTASSIUM SERPL-SCNC: 4 MMOL/L (ref 3.5–5.3)
PROT SERPL-MCNC: 6.3 G/DL (ref 6.4–8.2)
RBC # BLD AUTO: 3.95 X10*6/UL (ref 4–5.2)
SODIUM SERPL-SCNC: 139 MMOL/L (ref 136–145)
WBC # BLD AUTO: 6 X10*3/UL (ref 4.4–11.3)

## 2024-01-21 PROCEDURE — 2500000001 HC RX 250 WO HCPCS SELF ADMINISTERED DRUGS (ALT 637 FOR MEDICARE OP): Performed by: STUDENT IN AN ORGANIZED HEALTH CARE EDUCATION/TRAINING PROGRAM

## 2024-01-21 PROCEDURE — 2500000004 HC RX 250 GENERAL PHARMACY W/ HCPCS (ALT 636 FOR OP/ED)

## 2024-01-21 PROCEDURE — 99233 SBSQ HOSP IP/OBS HIGH 50: CPT | Performed by: STUDENT IN AN ORGANIZED HEALTH CARE EDUCATION/TRAINING PROGRAM

## 2024-01-21 PROCEDURE — 83735 ASSAY OF MAGNESIUM: CPT

## 2024-01-21 PROCEDURE — 84100 ASSAY OF PHOSPHORUS: CPT | Performed by: STUDENT IN AN ORGANIZED HEALTH CARE EDUCATION/TRAINING PROGRAM

## 2024-01-21 PROCEDURE — 80048 BASIC METABOLIC PNL TOTAL CA: CPT | Performed by: STUDENT IN AN ORGANIZED HEALTH CARE EDUCATION/TRAINING PROGRAM

## 2024-01-21 PROCEDURE — 82248 BILIRUBIN DIRECT: CPT

## 2024-01-21 PROCEDURE — 85025 COMPLETE CBC W/AUTO DIFF WBC: CPT

## 2024-01-21 PROCEDURE — 2500000001 HC RX 250 WO HCPCS SELF ADMINISTERED DRUGS (ALT 637 FOR MEDICARE OP)

## 2024-01-21 PROCEDURE — 36415 COLL VENOUS BLD VENIPUNCTURE: CPT

## 2024-01-21 PROCEDURE — 2500000004 HC RX 250 GENERAL PHARMACY W/ HCPCS (ALT 636 FOR OP/ED): Performed by: STUDENT IN AN ORGANIZED HEALTH CARE EDUCATION/TRAINING PROGRAM

## 2024-01-21 PROCEDURE — 1210000001 HC SEMI-PRIVATE ROOM DAILY

## 2024-01-21 RX ORDER — OXYCODONE HYDROCHLORIDE 5 MG/1
5 TABLET ORAL EVERY 6 HOURS PRN
Status: DISCONTINUED | OUTPATIENT
Start: 2024-01-21 | End: 2024-01-25

## 2024-01-21 RX ORDER — ACETAMINOPHEN 325 MG/1
650 TABLET ORAL 4 TIMES DAILY
Status: DISCONTINUED | OUTPATIENT
Start: 2024-01-21 | End: 2024-01-25

## 2024-01-21 RX ORDER — HYDROMORPHONE HYDROCHLORIDE 1 MG/ML
0.2 INJECTION, SOLUTION INTRAMUSCULAR; INTRAVENOUS; SUBCUTANEOUS ONCE
Status: COMPLETED | OUTPATIENT
Start: 2024-01-21 | End: 2024-01-21

## 2024-01-21 RX ORDER — ACETAMINOPHEN 650 MG/1
650 SUPPOSITORY RECTAL 4 TIMES DAILY
Status: DISCONTINUED | OUTPATIENT
Start: 2024-01-21 | End: 2024-01-25

## 2024-01-21 RX ORDER — ACETAMINOPHEN 160 MG/5ML
650 SOLUTION ORAL 4 TIMES DAILY
Status: DISCONTINUED | OUTPATIENT
Start: 2024-01-21 | End: 2024-01-25

## 2024-01-21 RX ORDER — HYDROMORPHONE HYDROCHLORIDE 1 MG/ML
0.2 INJECTION, SOLUTION INTRAMUSCULAR; INTRAVENOUS; SUBCUTANEOUS EVERY 4 HOURS PRN
Status: DISCONTINUED | OUTPATIENT
Start: 2024-01-21 | End: 2024-01-22

## 2024-01-21 RX ADMIN — HYDROMORPHONE HYDROCHLORIDE 0.2 MG: 1 INJECTION, SOLUTION INTRAMUSCULAR; INTRAVENOUS; SUBCUTANEOUS at 10:14

## 2024-01-21 RX ADMIN — DICYCLOMINE HYDROCHLORIDE 10 MG: 10 CAPSULE ORAL at 17:39

## 2024-01-21 RX ADMIN — OXYCODONE HYDROCHLORIDE 5 MG: 5 TABLET ORAL at 05:43

## 2024-01-21 RX ADMIN — BUPROPION HYDROCHLORIDE 150 MG: 150 TABLET, FILM COATED, EXTENDED RELEASE ORAL at 11:44

## 2024-01-21 RX ADMIN — DICYCLOMINE HYDROCHLORIDE 10 MG: 10 CAPSULE ORAL at 08:09

## 2024-01-21 RX ADMIN — DICYCLOMINE HYDROCHLORIDE 10 MG: 10 CAPSULE ORAL at 13:15

## 2024-01-21 RX ADMIN — ACETAMINOPHEN 650 MG: 325 TABLET ORAL at 08:09

## 2024-01-21 RX ADMIN — OXYCODONE HYDROCHLORIDE 5 MG: 5 TABLET ORAL at 11:42

## 2024-01-21 RX ADMIN — HYDROMORPHONE HYDROCHLORIDE 0.2 MG: 1 INJECTION, SOLUTION INTRAMUSCULAR; INTRAVENOUS; SUBCUTANEOUS at 21:07

## 2024-01-21 RX ADMIN — DICYCLOMINE HYDROCHLORIDE 10 MG: 10 CAPSULE ORAL at 21:01

## 2024-01-21 RX ADMIN — OXYCODONE HYDROCHLORIDE 5 MG: 5 TABLET ORAL at 23:42

## 2024-01-21 RX ADMIN — MELATONIN 6 MG: 3 TAB ORAL at 21:01

## 2024-01-21 RX ADMIN — ACETAMINOPHEN 650 MG: 325 TABLET ORAL at 21:01

## 2024-01-21 RX ADMIN — HYDROMORPHONE HYDROCHLORIDE 0.2 MG: 1 INJECTION, SOLUTION INTRAMUSCULAR; INTRAVENOUS; SUBCUTANEOUS at 13:18

## 2024-01-21 RX ADMIN — THIAMINE HCL TAB 100 MG 100 MG: 100 TAB at 08:09

## 2024-01-21 RX ADMIN — SERTRALINE HYDROCHLORIDE 100 MG: 100 TABLET ORAL at 11:44

## 2024-01-21 RX ADMIN — ARIPIPRAZOLE 2 MG: 2 TABLET ORAL at 21:01

## 2024-01-21 RX ADMIN — TRAZODONE HYDROCHLORIDE 100 MG: 50 TABLET ORAL at 21:01

## 2024-01-21 RX ADMIN — ENOXAPARIN SODIUM 40 MG: 100 INJECTION SUBCUTANEOUS at 13:15

## 2024-01-21 RX ADMIN — OXYCODONE HYDROCHLORIDE 5 MG: 5 TABLET ORAL at 17:39

## 2024-01-21 RX ADMIN — HYDROMORPHONE HYDROCHLORIDE 0.2 MG: 1 INJECTION, SOLUTION INTRAMUSCULAR; INTRAVENOUS; SUBCUTANEOUS at 16:20

## 2024-01-21 ASSESSMENT — PAIN DESCRIPTION - LOCATION
LOCATION: ABDOMEN

## 2024-01-21 ASSESSMENT — PAIN - FUNCTIONAL ASSESSMENT
PAIN_FUNCTIONAL_ASSESSMENT: 0-10

## 2024-01-21 ASSESSMENT — PAIN SCALES - GENERAL
PAINLEVEL_OUTOF10: 7
PAINLEVEL_OUTOF10: 7
PAINLEVEL_OUTOF10: 8
PAINLEVEL_OUTOF10: 8
PAINLEVEL_OUTOF10: 7
PAINLEVEL_OUTOF10: 7
PAINLEVEL_OUTOF10: 8
PAINLEVEL_OUTOF10: 7
PAINLEVEL_OUTOF10: 0 - NO PAIN
PAINLEVEL_OUTOF10: 8

## 2024-01-21 NOTE — PROGRESS NOTES
"Cece Massey is a 68 y.o. female on day 3 of admission presenting with Choledocholithiasis.    Subjective     Patient refers she is still experiencing pain in her abdomen and it radiates to her back.          Objective     Physical Exam  Constitutional:       General: She is not in acute distress.     Appearance: Normal appearance. She is not ill-appearing or toxic-appearing.   HENT:      Head: Normocephalic and atraumatic.   Eyes:      Conjunctiva/sclera: Conjunctivae normal.   Cardiovascular:      Rate and Rhythm: Normal rate and regular rhythm.      Pulses: Normal pulses.      Heart sounds: Normal heart sounds.   Pulmonary:      Effort: Pulmonary effort is normal.   Abdominal:      General: Abdomen is flat. Bowel sounds are normal. There is no distension.      Palpations: Abdomen is soft.      Comments: Tender in epigastric area   Musculoskeletal:         General: No swelling.      Right lower leg: No edema.      Left lower leg: No edema.   Skin:     Capillary Refill: Capillary refill takes 2 to 3 seconds.   Neurological:      General: No focal deficit present.      Mental Status: She is alert and oriented to person, place, and time. Mental status is at baseline.   Psychiatric:         Mood and Affect: Mood normal.         Behavior: Behavior normal.            Last Recorded Vitals  Blood pressure 110/79, pulse 89, temperature 36.5 °C (97.7 °F), resp. rate 18, height 1.676 m (5' 6\"), weight 50.8 kg (112 lb), SpO2 95 %.  Intake/Output last 3 Shifts:  I/O last 3 completed shifts:  In: 740 (14.6 mL/kg) [P.O.:240; IV Piggyback:500]  Out: - (0 mL/kg)   Weight: 50.8 kg     Relevant Results  Scheduled medications  acetaminophen, 650 mg, oral, 4x daily   Or  acetaminophen, 650 mg, nasogastric tube, 4x daily   Or  acetaminophen, 650 mg, rectal, 4x daily  ARIPiprazole, 2 mg, oral, Nightly  buPROPion XL, 150 mg, oral, Daily  dicyclomine, 10 mg, oral, 4x daily  enoxaparin, 40 mg, subcutaneous, q24h  melatonin, 6 mg, oral, " Nightly  polyethylene glycol, 17 g, oral, Nightly  sertraline, 100 mg, oral, Daily  [Held by provider] sucralfate, 1 g, oral, TID with meals  thiamine, 100 mg, oral, Daily  traZODone, 100 mg, oral, Nightly      Continuous medications     PRN medications  PRN medications: oxyCODONE         Results for orders placed or performed during the hospital encounter of 01/18/24 (from the past 24 hour(s))   CBC and Auto Differential   Result Value Ref Range    WBC 6.0 4.4 - 11.3 x10*3/uL    nRBC 0.0 0.0 - 0.0 /100 WBCs    RBC 3.95 (L) 4.00 - 5.20 x10*6/uL    Hemoglobin 13.2 12.0 - 16.0 g/dL    Hematocrit 42.3 36.0 - 46.0 %     (H) 80 - 100 fL    MCH 33.4 26.0 - 34.0 pg    MCHC 31.2 (L) 32.0 - 36.0 g/dL    RDW 14.3 11.5 - 14.5 %    Platelets 273 150 - 450 x10*3/uL    Neutrophils % 50.4 40.0 - 80.0 %    Immature Granulocytes %, Automated 0.2 0.0 - 0.9 %    Lymphocytes % 38.9 13.0 - 44.0 %    Monocytes % 8.7 2.0 - 10.0 %    Eosinophils % 1.3 0.0 - 6.0 %    Basophils % 0.5 0.0 - 2.0 %    Neutrophils Absolute 3.03 1.20 - 7.70 x10*3/uL    Immature Granulocytes Absolute, Automated 0.01 0.00 - 0.70 x10*3/uL    Lymphocytes Absolute 2.34 1.20 - 4.80 x10*3/uL    Monocytes Absolute 0.52 0.10 - 1.00 x10*3/uL    Eosinophils Absolute 0.08 0.00 - 0.70 x10*3/uL    Basophils Absolute 0.03 0.00 - 0.10 x10*3/uL   Magnesium   Result Value Ref Range    Magnesium 1.87 1.60 - 2.40 mg/dL   Hepatic Function Panel   Result Value Ref Range    Albumin 3.6 3.4 - 5.0 g/dL    Bilirubin, Total 0.3 0.0 - 1.2 mg/dL    Bilirubin, Direct 0.0 0.0 - 0.3 mg/dL    Alkaline Phosphatase 67 33 - 136 U/L    ALT 6 (L) 7 - 45 U/L    AST 16 9 - 39 U/L    Total Protein 6.3 (L) 6.4 - 8.2 g/dL   Phosphorus   Result Value Ref Range    Phosphorus 4.4 2.5 - 4.9 mg/dL   Basic Metabolic Panel   Result Value Ref Range    Glucose 86 74 - 99 mg/dL    Sodium 139 136 - 145 mmol/L    Potassium 4.0 3.5 - 5.3 mmol/L    Chloride 98 98 - 107 mmol/L    Bicarbonate 26 21 - 32 mmol/L     Anion Gap 19 10 - 20 mmol/L    Urea Nitrogen 8 6 - 23 mg/dL    Creatinine 0.69 0.50 - 1.05 mg/dL    eGFR >90 >60 mL/min/1.73m*2    Calcium 9.7 8.6 - 10.6 mg/dL                   Assessment/Plan   Principal Problem:    Choledocholithiasis    Hospital course    Ms. Massey is a 70 yo F with h/o Bilroth II for perforated gastric ulcer, cholecystectomy, tobacco use disorder, chronic back pain, anxiety and depression who presented to an OSH with n/v, abd pain with labs showing lipase >800, consistent with acute pancreatitis, transferred to [facility] for possible ERCP. MRI/MRCP at OSH(1/16): Apparent 5 mm ovoid filling defect in the distal periampullary common bile duct on coronal sequences with abrupt concave cut off/meniscal sign present on MRCP sequences, suggesting possible choledocholithiasis. Dilatation of the main pancreatic duct measuring up to at least 4 to 5 mm at the level of the pancreatic body and tail. ERCP done 1/19, was difficult to maneuver given complex anatomy s/p Billroth II. Multiple attempts were made to cannulate the bile duct, but these attempts were unsuccessful. Given clear bile noted, and lack of ampullary mass, in setting of normal LFTs, the decision was made to abort further attempts.         Assessment:    Ms. Massey is a 70 yo F with h/o Bilroth II for perforated gastric ulcer, cholecystectomy, tobacco use disorder, chronic back pain, anxiety and depression who presented to an OSH with n/v, abd pain with labs showing lipase >800, consistent with acute pancreatitis, transferred to [facility] for possible ERCP. Procedure was done at 1/19 but was aborted due to difficult anatomy. Currently managing supportively and placed NPO after midnight for possible procedure with advanced endoscopy.       Updates     Patient will continue CLD due to pain.  Will have discussion with advanced endoscopist for possible US  Will manage pain with oral medication  NPO after midnight        #Acute  pancreatitis  #Pancreatic duct dilation  #Pancreatic cysts  Plan  - Patient placed NPO as detailed above. Managing supportively  - Tylenol 650mg q4h  (moderate), oxy 5mg q6h PRN (Severe)     :: MRI/MRCP at OSH (1/16): Several small pancreatic cystic lesions measuring up to 10 x 6 mm at the pancreatic tail. Additional smaller lesions (measuring up to 6 x 6 mm) and punctate cystic lesions towards the pancreatic body and head/uncinate. These may potentially communicate with the main pancreatic duct.   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  - Daily B12 supplementation        #Anxiety  #Depression  -continue aripiprazole 2mg QHS   -continue trazodone 100mg QHS  -continue melatonin 6mg QHS   -continue sertraline 100mg daily   -continue bupropion 150mg XL daily   -continue thiamine 100mg daily         #Tobacco use disorder  :: Patient declined nicotine replacement        F: PRN  E: PRN  N: Clear liquid diet  A: PIVs  DVT: Lovenox   GI: Pantoprazole 40mg daily (continued from OSH)     SDM:  Earl: [phone]  Code Status: Full code (does not want to be machine dependent for a prolonged time)         Jean Story   PGY2 Medicine/Genetics  Patient seen and discussed with Dr. Horowitz

## 2024-01-22 LAB
ALBUMIN SERPL BCP-MCNC: 3.4 G/DL (ref 3.4–5)
ALP SERPL-CCNC: 65 U/L (ref 33–136)
ALT SERPL W P-5'-P-CCNC: 6 U/L (ref 7–45)
ANION GAP SERPL CALC-SCNC: 11 MMOL/L (ref 10–20)
AST SERPL W P-5'-P-CCNC: 12 U/L (ref 9–39)
BILIRUB DIRECT SERPL-MCNC: 0 MG/DL (ref 0–0.3)
BILIRUB SERPL-MCNC: 0.3 MG/DL (ref 0–1.2)
BUN SERPL-MCNC: 9 MG/DL (ref 6–23)
CALCIUM SERPL-MCNC: 9.5 MG/DL (ref 8.6–10.6)
CHLORIDE SERPL-SCNC: 99 MMOL/L (ref 98–107)
CO2 SERPL-SCNC: 31 MMOL/L (ref 21–32)
CREAT SERPL-MCNC: 0.73 MG/DL (ref 0.5–1.05)
EGFRCR SERPLBLD CKD-EPI 2021: 90 ML/MIN/1.73M*2
ERYTHROCYTE [DISTWIDTH] IN BLOOD BY AUTOMATED COUNT: 13.7 % (ref 11.5–14.5)
GLUCOSE SERPL-MCNC: 92 MG/DL (ref 74–99)
HCT VFR BLD AUTO: 41.9 % (ref 36–46)
HGB BLD-MCNC: 13.3 G/DL (ref 12–16)
MAGNESIUM SERPL-MCNC: 1.71 MG/DL (ref 1.6–2.4)
MCH RBC QN AUTO: 33.6 PG (ref 26–34)
MCHC RBC AUTO-ENTMCNC: 31.7 G/DL (ref 32–36)
MCV RBC AUTO: 106 FL (ref 80–100)
NRBC BLD-RTO: 0 /100 WBCS (ref 0–0)
PHOSPHATE SERPL-MCNC: 5.4 MG/DL (ref 2.5–4.9)
PLATELET # BLD AUTO: 315 X10*3/UL (ref 150–450)
POTASSIUM SERPL-SCNC: 4.4 MMOL/L (ref 3.5–5.3)
PROT SERPL-MCNC: 6.2 G/DL (ref 6.4–8.2)
RBC # BLD AUTO: 3.96 X10*6/UL (ref 4–5.2)
SODIUM SERPL-SCNC: 137 MMOL/L (ref 136–145)
WBC # BLD AUTO: 4.8 X10*3/UL (ref 4.4–11.3)

## 2024-01-22 PROCEDURE — 1210000001 HC SEMI-PRIVATE ROOM DAILY

## 2024-01-22 PROCEDURE — 84100 ASSAY OF PHOSPHORUS: CPT | Performed by: STUDENT IN AN ORGANIZED HEALTH CARE EDUCATION/TRAINING PROGRAM

## 2024-01-22 PROCEDURE — 36415 COLL VENOUS BLD VENIPUNCTURE: CPT | Performed by: STUDENT IN AN ORGANIZED HEALTH CARE EDUCATION/TRAINING PROGRAM

## 2024-01-22 PROCEDURE — 99233 SBSQ HOSP IP/OBS HIGH 50: CPT

## 2024-01-22 PROCEDURE — 83735 ASSAY OF MAGNESIUM: CPT | Performed by: STUDENT IN AN ORGANIZED HEALTH CARE EDUCATION/TRAINING PROGRAM

## 2024-01-22 PROCEDURE — 2500000004 HC RX 250 GENERAL PHARMACY W/ HCPCS (ALT 636 FOR OP/ED): Performed by: STUDENT IN AN ORGANIZED HEALTH CARE EDUCATION/TRAINING PROGRAM

## 2024-01-22 PROCEDURE — 2500000001 HC RX 250 WO HCPCS SELF ADMINISTERED DRUGS (ALT 637 FOR MEDICARE OP)

## 2024-01-22 PROCEDURE — 82248 BILIRUBIN DIRECT: CPT | Performed by: STUDENT IN AN ORGANIZED HEALTH CARE EDUCATION/TRAINING PROGRAM

## 2024-01-22 PROCEDURE — 2500000004 HC RX 250 GENERAL PHARMACY W/ HCPCS (ALT 636 FOR OP/ED)

## 2024-01-22 PROCEDURE — 85027 COMPLETE CBC AUTOMATED: CPT | Performed by: STUDENT IN AN ORGANIZED HEALTH CARE EDUCATION/TRAINING PROGRAM

## 2024-01-22 PROCEDURE — 80053 COMPREHEN METABOLIC PANEL: CPT | Performed by: STUDENT IN AN ORGANIZED HEALTH CARE EDUCATION/TRAINING PROGRAM

## 2024-01-22 PROCEDURE — 2500000001 HC RX 250 WO HCPCS SELF ADMINISTERED DRUGS (ALT 637 FOR MEDICARE OP): Performed by: STUDENT IN AN ORGANIZED HEALTH CARE EDUCATION/TRAINING PROGRAM

## 2024-01-22 RX ORDER — AMOXICILLIN 250 MG
1 CAPSULE ORAL NIGHTLY
Status: DISCONTINUED | OUTPATIENT
Start: 2024-01-22 | End: 2024-01-22

## 2024-01-22 RX ORDER — HYDROMORPHONE HYDROCHLORIDE 1 MG/ML
0.2 INJECTION, SOLUTION INTRAMUSCULAR; INTRAVENOUS; SUBCUTANEOUS EVERY 4 HOURS PRN
Status: DISCONTINUED | OUTPATIENT
Start: 2024-01-22 | End: 2024-01-25

## 2024-01-22 RX ORDER — AMOXICILLIN 250 MG
1 CAPSULE ORAL NIGHTLY PRN
Status: DISCONTINUED | OUTPATIENT
Start: 2024-01-22 | End: 2024-01-22

## 2024-01-22 RX ORDER — AMOXICILLIN 250 MG
1 CAPSULE ORAL NIGHTLY
Status: DISCONTINUED | OUTPATIENT
Start: 2024-01-22 | End: 2024-01-26 | Stop reason: HOSPADM

## 2024-01-22 RX ORDER — POLYETHYLENE GLYCOL 3350 17 G/17G
17 POWDER, FOR SOLUTION ORAL 2 TIMES DAILY
Status: DISCONTINUED | OUTPATIENT
Start: 2024-01-22 | End: 2024-01-26 | Stop reason: HOSPADM

## 2024-01-22 RX ADMIN — DICYCLOMINE HYDROCHLORIDE 10 MG: 10 CAPSULE ORAL at 12:34

## 2024-01-22 RX ADMIN — OXYCODONE HYDROCHLORIDE 5 MG: 5 TABLET ORAL at 12:34

## 2024-01-22 RX ADMIN — TRAZODONE HYDROCHLORIDE 100 MG: 50 TABLET ORAL at 20:08

## 2024-01-22 RX ADMIN — SENNOSIDES AND DOCUSATE SODIUM 1 TABLET: 8.6; 5 TABLET ORAL at 20:08

## 2024-01-22 RX ADMIN — DICYCLOMINE HYDROCHLORIDE 10 MG: 10 CAPSULE ORAL at 08:49

## 2024-01-22 RX ADMIN — SERTRALINE HYDROCHLORIDE 100 MG: 100 TABLET ORAL at 10:30

## 2024-01-22 RX ADMIN — ACETAMINOPHEN 650 MG: 325 TABLET ORAL at 20:08

## 2024-01-22 RX ADMIN — ENOXAPARIN SODIUM 40 MG: 100 INJECTION SUBCUTANEOUS at 16:43

## 2024-01-22 RX ADMIN — BUPROPION HYDROCHLORIDE 150 MG: 150 TABLET, FILM COATED, EXTENDED RELEASE ORAL at 10:30

## 2024-01-22 RX ADMIN — DICYCLOMINE HYDROCHLORIDE 10 MG: 10 CAPSULE ORAL at 16:36

## 2024-01-22 RX ADMIN — OXYCODONE HYDROCHLORIDE 5 MG: 5 TABLET ORAL at 20:08

## 2024-01-22 RX ADMIN — HYDROMORPHONE HYDROCHLORIDE 0.2 MG: 1 INJECTION, SOLUTION INTRAMUSCULAR; INTRAVENOUS; SUBCUTANEOUS at 10:34

## 2024-01-22 RX ADMIN — THIAMINE HCL TAB 100 MG 100 MG: 100 TAB at 08:49

## 2024-01-22 RX ADMIN — HYDROMORPHONE HYDROCHLORIDE 0.2 MG: 1 INJECTION, SOLUTION INTRAMUSCULAR; INTRAVENOUS; SUBCUTANEOUS at 16:41

## 2024-01-22 RX ADMIN — MELATONIN 6 MG: 3 TAB ORAL at 20:08

## 2024-01-22 RX ADMIN — OXYCODONE HYDROCHLORIDE 5 MG: 5 TABLET ORAL at 08:49

## 2024-01-22 RX ADMIN — ARIPIPRAZOLE 2 MG: 2 TABLET ORAL at 20:08

## 2024-01-22 RX ADMIN — DICYCLOMINE HYDROCHLORIDE 10 MG: 10 CAPSULE ORAL at 20:08

## 2024-01-22 RX ADMIN — HYDROMORPHONE HYDROCHLORIDE 0.2 MG: 1 INJECTION, SOLUTION INTRAMUSCULAR; INTRAVENOUS; SUBCUTANEOUS at 04:58

## 2024-01-22 ASSESSMENT — PAIN - FUNCTIONAL ASSESSMENT
PAIN_FUNCTIONAL_ASSESSMENT: 0-10

## 2024-01-22 ASSESSMENT — PAIN SCALES - GENERAL
PAINLEVEL_OUTOF10: 2
PAINLEVEL_OUTOF10: 7
PAINLEVEL_OUTOF10: 8
PAINLEVEL_OUTOF10: 7
PAINLEVEL_OUTOF10: 8
PAINLEVEL_OUTOF10: 2
PAINLEVEL_OUTOF10: 5 - MODERATE PAIN
PAINLEVEL_OUTOF10: 8
PAINLEVEL_OUTOF10: 8

## 2024-01-22 ASSESSMENT — PAIN DESCRIPTION - LOCATION
LOCATION: ABDOMEN

## 2024-01-22 NOTE — CARE PLAN
The patient's goals for the shift include      Problem: Pain  Goal: My pain/discomfort is manageable  Outcome: Progressing     Problem: Safety  Goal: Patient will be injury free during hospitalization  Outcome: Progressing     Problem: Safety  Goal: I will remain free of falls  Outcome: Progressing     Problem: Daily Care  Goal: Daily care needs are met  Outcome: Progressing     Problem: Psychosocial Needs  Goal: Demonstrates ability to cope with hospitalization/illness  Outcome: Progressing     Problem: Psychosocial Needs  Goal: Demonstrates ability to cope with hospitalization/illness  Outcome: Progressing     The clinical goals for the shift include Patient will remain free from falls and injury throughout shift    Patient remained free from falls an injury throughout shift. Patient currently resting comfortably with stable vital signs. Patient complaints of pain were managed with PRN pain medications as prescribed. Patient has been NPO since midnight in anticipation of procedure today.

## 2024-01-22 NOTE — PROGRESS NOTES
"Cece Massey is a 68 y.o. female on day 4 of admission presenting with Choledocholithiasis.    Subjective   Pt continues to have significant abdominal pain, but states she was tolerating clear liquid diet prior to being placed NPO. Would like diet to be advanced.   Over the past day, she has received 5 x dilaudid 0.2 mg IV and 5 x oxy 5 mg PO.  Denies CP, dizziness, constipation, fever/chills.       Objective     Physical Exam  Constitutional:       Appearance: She is not ill-appearing.   HENT:      Mouth/Throat:      Mouth: Mucous membranes are dry.   Cardiovascular:      Rate and Rhythm: Normal rate and regular rhythm.      Heart sounds: Normal heart sounds.   Pulmonary:      Breath sounds: Normal breath sounds.   Abdominal:      General: Abdomen is flat. There is no distension.      Tenderness: There is abdominal tenderness (generalized abdominal pain w/ radiating back pain). There is guarding. There is no rebound.   Musculoskeletal:      Right lower leg: No edema.      Left lower leg: No edema.   Neurological:      General: No focal deficit present.      Mental Status: She is oriented to person, place, and time.         Last Recorded Vitals  Blood pressure 121/88, pulse 88, temperature 36.6 °C (97.9 °F), resp. rate 18, height 1.676 m (5' 6\"), weight 50.8 kg (112 lb), SpO2 97 %.  Intake/Output last 3 Shifts:  I/O last 3 completed shifts:  In: 1220 (24 mL/kg) [P.O.:720; IV Piggyback:500]  Out: 2 (0 mL/kg) [Urine:2 (0 mL/kg/hr)]  Weight: 50.8 kg     Relevant Results  Scheduled medications  acetaminophen, 650 mg, oral, 4x daily   Or  acetaminophen, 650 mg, nasogastric tube, 4x daily   Or  acetaminophen, 650 mg, rectal, 4x daily  ARIPiprazole, 2 mg, oral, Nightly  buPROPion XL, 150 mg, oral, Daily  dicyclomine, 10 mg, oral, 4x daily  enoxaparin, 40 mg, subcutaneous, q24h  melatonin, 6 mg, oral, Nightly  polyethylene glycol, 17 g, oral, BID  sennosides-docusate sodium, 1 tablet, oral, Nightly  sertraline, 100 mg, oral, " Daily  [Held by provider] sucralfate, 1 g, oral, TID with meals  thiamine, 100 mg, oral, Daily  traZODone, 100 mg, oral, Nightly      Continuous medications     PRN medications  PRN medications: HYDROmorphone, oxyCODONE      Assessment/Plan   Principal Problem:    Choledocholithiasis    Ms. Massey is a 68 yo F with h/o Bilroth II for perforated gastric ulcer, cholecystectomy, tobacco use disorder, chronic back pain, anxiety and depression who presented to an OSH with n/v, abd pain with labs showing lipase >800, consistent with acute pancreatitis, transferred to [facility] for possible ERCP. Procedure was done at 1/19 but was aborted due to difficult anatomy. Currently managing supportively and placed NPO after midnight for possible procedure with advanced endoscopy.         Updates  - Discussed pt's OSH MRCP w/ radiology: there is Common bile duct dilation which warrants percutaneous cholangiogram w/ IR. Further MRCP would not be useful.  -Plan for cholangiogram w/ IR tomorrow, NPO @ midnight  -Advance to full liquid diet       #Acute pancreatitis  #Pancreatic duct dilation  #Pancreatic cysts  :: MRI/MRCP at OSH (1/16): Several small pancreatic cystic lesions measuring up to 10 x 6 mm at the pancreatic tail. Additional smaller lesions (measuring up to 6 x 6 mm) and punctate cystic lesions towards the pancreatic body and head/uncinate. These may potentially communicate with the main pancreatic duct.   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

## 2024-01-23 ENCOUNTER — APPOINTMENT (OUTPATIENT)
Dept: RADIOLOGY | Facility: HOSPITAL | Age: 69
DRG: 444 | End: 2024-01-23
Payer: MEDICARE

## 2024-01-23 LAB
LABORATORY REPORT: NORMAL
PETH INTERPRETATION: NORMAL
PLPETH BLD-MCNC: 95 NG/ML
POPETH BLD-MCNC: 226 NG/ML

## 2024-01-23 PROCEDURE — 2720000007 HC OR 272 NO HCPCS

## 2024-01-23 PROCEDURE — C1894 INTRO/SHEATH, NON-LASER: HCPCS

## 2024-01-23 PROCEDURE — 76705 ECHO EXAM OF ABDOMEN: CPT | Performed by: RADIOLOGY

## 2024-01-23 PROCEDURE — 1210000001 HC SEMI-PRIVATE ROOM DAILY

## 2024-01-23 PROCEDURE — 2500000004 HC RX 250 GENERAL PHARMACY W/ HCPCS (ALT 636 FOR OP/ED)

## 2024-01-23 PROCEDURE — 2500000001 HC RX 250 WO HCPCS SELF ADMINISTERED DRUGS (ALT 637 FOR MEDICARE OP)

## 2024-01-23 PROCEDURE — 2500000001 HC RX 250 WO HCPCS SELF ADMINISTERED DRUGS (ALT 637 FOR MEDICARE OP): Performed by: STUDENT IN AN ORGANIZED HEALTH CARE EDUCATION/TRAINING PROGRAM

## 2024-01-23 PROCEDURE — 99233 SBSQ HOSP IP/OBS HIGH 50: CPT

## 2024-01-23 PROCEDURE — 2550000001 HC RX 255 CONTRASTS: Performed by: INTERNAL MEDICINE

## 2024-01-23 PROCEDURE — 99222 1ST HOSP IP/OBS MODERATE 55: CPT | Performed by: PHYSICIAN ASSISTANT

## 2024-01-23 RX ORDER — GABAPENTIN 100 MG/1
200 CAPSULE ORAL 2 TIMES DAILY
Status: DISCONTINUED | OUTPATIENT
Start: 2024-01-23 | End: 2024-01-24

## 2024-01-23 RX ADMIN — MELATONIN 6 MG: 3 TAB ORAL at 20:06

## 2024-01-23 RX ADMIN — OXYCODONE HYDROCHLORIDE 5 MG: 5 TABLET ORAL at 06:30

## 2024-01-23 RX ADMIN — IOHEXOL 50 ML: 350 INJECTION, SOLUTION INTRAVENOUS at 14:27

## 2024-01-23 RX ADMIN — SENNOSIDES AND DOCUSATE SODIUM 1 TABLET: 8.6; 5 TABLET ORAL at 20:07

## 2024-01-23 RX ADMIN — BUPROPION HYDROCHLORIDE 150 MG: 150 TABLET, FILM COATED, EXTENDED RELEASE ORAL at 08:30

## 2024-01-23 RX ADMIN — HYDROMORPHONE HYDROCHLORIDE 0.2 MG: 1 INJECTION, SOLUTION INTRAMUSCULAR; INTRAVENOUS; SUBCUTANEOUS at 18:13

## 2024-01-23 RX ADMIN — ACETAMINOPHEN 650 MG: 325 TABLET ORAL at 20:06

## 2024-01-23 RX ADMIN — HYDROMORPHONE HYDROCHLORIDE 0.2 MG: 1 INJECTION, SOLUTION INTRAMUSCULAR; INTRAVENOUS; SUBCUTANEOUS at 22:44

## 2024-01-23 RX ADMIN — TRAZODONE HYDROCHLORIDE 100 MG: 50 TABLET ORAL at 20:06

## 2024-01-23 RX ADMIN — SERTRALINE HYDROCHLORIDE 100 MG: 100 TABLET ORAL at 08:30

## 2024-01-23 RX ADMIN — ARIPIPRAZOLE 2 MG: 2 TABLET ORAL at 20:07

## 2024-01-23 RX ADMIN — OXYCODONE HYDROCHLORIDE 5 MG: 5 TABLET ORAL at 20:08

## 2024-01-23 RX ADMIN — THIAMINE HCL TAB 100 MG 100 MG: 100 TAB at 08:30

## 2024-01-23 RX ADMIN — DICYCLOMINE HYDROCHLORIDE 10 MG: 10 CAPSULE ORAL at 08:30

## 2024-01-23 RX ADMIN — OXYCODONE HYDROCHLORIDE 5 MG: 5 TABLET ORAL at 15:56

## 2024-01-23 RX ADMIN — ENOXAPARIN SODIUM 40 MG: 100 INJECTION SUBCUTANEOUS at 15:57

## 2024-01-23 RX ADMIN — ACETAMINOPHEN 650 MG: 325 TABLET ORAL at 15:39

## 2024-01-23 RX ADMIN — ACETAMINOPHEN 650 MG: 325 TABLET ORAL at 08:32

## 2024-01-23 RX ADMIN — HYDROMORPHONE HYDROCHLORIDE 0.2 MG: 1 INJECTION, SOLUTION INTRAMUSCULAR; INTRAVENOUS; SUBCUTANEOUS at 11:25

## 2024-01-23 RX ADMIN — DICYCLOMINE HYDROCHLORIDE 10 MG: 10 CAPSULE ORAL at 18:13

## 2024-01-23 RX ADMIN — DICYCLOMINE HYDROCHLORIDE 10 MG: 10 CAPSULE ORAL at 20:06

## 2024-01-23 RX ADMIN — GABAPENTIN 200 MG: 100 CAPSULE ORAL at 20:06

## 2024-01-23 ASSESSMENT — ENCOUNTER SYMPTOMS
ABDOMINAL DISTENTION: 0
CONSTIPATION: 0
ACTIVITY CHANGE: 1
DIARRHEA: 0
BLOOD IN STOOL: 0
APPETITE CHANGE: 1
UNEXPECTED WEIGHT CHANGE: 1
NEUROLOGICAL NEGATIVE: 1
ABDOMINAL PAIN: 1
MUSCULOSKELETAL NEGATIVE: 1
CARDIOVASCULAR NEGATIVE: 1
RESPIRATORY NEGATIVE: 1

## 2024-01-23 ASSESSMENT — PAIN SCALES - GENERAL
PAINLEVEL_OUTOF10: 9
PAINLEVEL_OUTOF10: 8
PAINLEVEL_OUTOF10: 6
PAINLEVEL_OUTOF10: 8
PAINLEVEL_OUTOF10: 8
PAINLEVEL_OUTOF10: 7
PAINLEVEL_OUTOF10: 8
PAINLEVEL_OUTOF10: 7

## 2024-01-23 ASSESSMENT — ACTIVITIES OF DAILY LIVING (ADL): LACK_OF_TRANSPORTATION: NO

## 2024-01-23 ASSESSMENT — PAIN DESCRIPTION - LOCATION
LOCATION: ABDOMEN

## 2024-01-23 ASSESSMENT — PAIN DESCRIPTION - DESCRIPTORS: DESCRIPTORS: THROBBING;PATIENT UNABLE TO DESCRIBE

## 2024-01-23 NOTE — CONSULTS
INTERVENTIONAL RADIOLOGY INPATIENT CONSULTATION  [facility]    Subjective  Cece Massey, 68 y.o. female is a patent presenting with:  Abdominal Pain.    Ms. Massey is a 70 yo current smoker with a PMHx/PSHx significant for Bilroth II for perforated gastric ulcer, GJ ulcers, labeled to have chronic pancreatitis, chronic lower back pain s/p 3 surgeries, who presented to [facility] for abdominal pain, found to have acute pancreatitis and was transferred to [facility] for potential ERCP given findings on MRI c/f choledocholithiasis.       She reports pain started on a Friday and she went to the hospital on a Monday. The pain is all over and goes to her back. Associated with nausea and vomiting. Denies Hx of joint pains and/or chronic abdominal pain. She said she did well with a clear liquid diet, but had vomiting with a full liquid diet.     GI attempted ERCP 1/19, however procedure aborted due to atypical anatomy secondary to prior surgeries.  IR has been consulted for evaluation of percutaneous cholangiogram to further assess etiology of extrahepatic common bile duct dilatation.    Interventional Radiology has been consulted for: percutaneous cholangiogram    Review of Systems   Constitutional:  Positive for activity change, appetite change and unexpected weight change.   HENT: Negative.     Respiratory: Negative.     Cardiovascular: Negative.    Gastrointestinal:  Positive for abdominal pain. Negative for abdominal distention, blood in stool, constipation and diarrhea.   Genitourinary: Negative.    Musculoskeletal: Negative.    Neurological: Negative.        History reviewed. No pertinent past medical history.  Past Surgical History:   Procedure Laterality Date    BILROTH II PROCEDURE      CHOLECYSTECTOMY      OTHER SURGICAL HISTORY N/A     3 lumbar surgeries    OTHER SURGICAL HISTORY      cervical spine surgery     Social History     Socioeconomic History    Marital status:      Spouse name:  "Not on file    Number of children: Not on file    Years of education: Not on file    Highest education level: Not on file   Occupational History    Not on file   Tobacco Use    Smoking status: Every Day     Packs/day: 1.00     Years: 29.00     Additional pack years: 0.00     Total pack years: 29.00     Types: Cigarettes    Smokeless tobacco: Never   Substance and Sexual Activity    Alcohol use: Yes     Alcohol/week: 2.0 standard drinks of alcohol     Types: 2 Shots of liquor per week    Drug use: Never    Sexual activity: Not on file   Other Topics Concern    Not on file   Social History Narrative    Not on file     Social Determinants of Health     Financial Resource Strain: Low Risk  (1/18/2024)    Overall Financial Resource Strain (CARDIA)     Difficulty of Paying Living Expenses: Not hard at all   Food Insecurity: Not on file   Transportation Needs: No Transportation Needs (1/18/2024)    PRAPARE - Transportation     Lack of Transportation (Medical): No     Lack of Transportation (Non-Medical): No   Physical Activity: Not on file   Stress: Not on file   Social Connections: Not on file   Intimate Partner Violence: Not on file   Housing Stability: Low Risk  (1/18/2024)    Housing Stability Vital Sign     Unable to Pay for Housing in the Last Year: No     Number of Places Lived in the Last Year: 0     Unstable Housing in the Last Year: No     No family history on file.  Allergies   Allergen Reactions    Lisinopril Unknown     Other reaction(s): Other (See Comments)    Metoprolol Unknown     Other reaction(s): Other (See Comments)    Naproxen Nausea And Vomiting and Other     Dry Heavies     Other reaction(s): Other: See Comments   Dry Heavies    Pt reports \"dry heaving\"     No current facility-administered medications on file prior to encounter.     No current outpatient medications on file prior to encounter.       Objective    Vitals:    01/22/24 1351 01/22/24 2125 01/22/24 2135 01/23/24 0547   BP: (!) 128/92 " 98/70 90/64 (!) 114/93   Pulse: 83 89  89   Resp: 19 18  18   Temp: 36.4 °C (97.5 °F) 35 °C (95 °F) 36.5 °C (97.7 °F) 36.5 °C (97.7 °F)   TempSrc:   Temporal    SpO2: 98% 94%  100%   Weight:       Height:           No results found for this or any previous visit (from the past 24 hour(s)).    No results found.     Physical Exam  Vitals reviewed.   Constitutional:       General: She is not in acute distress.     Appearance: She is normal weight.      Comments: Sitting up in bed, alert and cooperative   HENT:      Head: Normocephalic.      Mouth/Throat:      Mouth: Mucous membranes are moist.   Eyes:      Conjunctiva/sclera: Conjunctivae normal.   Cardiovascular:      Rate and Rhythm: Normal rate and regular rhythm.      Pulses: Normal pulses.      Heart sounds: Normal heart sounds.   Pulmonary:      Effort: Pulmonary effort is normal.      Breath sounds: Normal breath sounds.   Abdominal:      General: Abdomen is flat. Bowel sounds are normal. There is no distension.      Palpations: Abdomen is soft.      Tenderness: There is abdominal tenderness. There is no guarding.   Musculoskeletal:      Cervical back: Neck supple.   Skin:     General: Skin is warm and dry.      Coloration: Skin is not jaundiced.   Neurological:      General: No focal deficit present.      Mental Status: She is alert and oriented to person, place, and time. Mental status is at baseline.   Psychiatric:         Mood and Affect: Mood normal.         Behavior: Behavior normal.         Assessment & Plan   MELD 3.0: 7 at 1/20/2024  6:16 AM  MELD-Na: 6 at 1/20/2024  6:16 AM  Calculated from:  Serum Creatinine: 0.77 mg/dL (Using min of 1 mg/dL) at 1/20/2024  6:16 AM  Serum Sodium: 139 mmol/L (Using max of 137 mmol/L) at 1/20/2024  6:16 AM  Total Bilirubin: 0.4 mg/dL (Using min of 1 mg/dL) at 1/20/2024  6:16 AM  Serum Albumin: 3.5 g/dL at 1/20/2024  6:16 AM  INR(ratio): 1.0 at 1/18/2024  6:52 AM  Age at listing (hypothetical): 68 years  Sex: Female at  1/20/2024  6:16 AM      Review of imaging reveals     IMPRESSION:  1.  Abrupt tapering of the distal common bile duct with proximal  dilatation of the intra- and extrahepatic biliary ductal system as  well as the pancreatic duct. No obstructing lesion or stone was  identified within limitations of the current study. However, there is  question of associated focal asymmetric thickening of the 2nd  duodenal segment in the ampullary region. ERCP may be of value for  further evaluation, if technically feasible.  2. Focal areas of side-branch dilatation arising from the main  pancreatic duct in the pancreatic tail, which may be sequelae from  aforementioned proximal biliary ductal obstruction. However, side  branch IPMNs may also be considered.    Current plan:   -Dr. Min aware of patient, reviewing case and available imaging  -d/w patient procedure and expected risks/benefits  -with non-dilated hepatic biliary ducts, d/w patient expectations for pain for the procedure; patient agreeable with procedural sedation  -plan for percutaneous cholangiogram today      Elizabeth Maloney PA-C     Vascular and Interventional Radiology  [facility]  [id number] inpatient Nursing Quarterback  [phone] Resident on-call pager    NON-Urgent on call weekends and after hours weekdays (5pm - 5am) IR pager: [phone]  Urgent & emergent on call weekends and after hours weekdays (5pm-7am) IR pager: [phone]

## 2024-01-23 NOTE — PROGRESS NOTES
01/23/24 1459   Discharge Planning   Living Arrangements Spouse/significant other   Support Systems Spouse/significant other   Assistance Needed Pt reports being independent with ADL's + drives, no assistance is needed.   Type of Residence Private residence   Who is requesting discharge planning? Provider   Home or Post Acute Services None   Patient expects to be discharged to: Home   Does the patient need discharge transport arranged? No  ( will drive home via private vehicle.)   Financial Resource Strain   How hard is it for you to pay for the very basics like food, housing, medical care, and heating? Not hard   Housing Stability   In the last 12 months, was there a time when you were not able to pay the mortgage or rent on time? N   In the last 12 months, how many places have you lived? 1   In the last 12 months, was there a time when you did not have a steady place to sleep or slept in a shelter (including now)? N   Transportation Needs   In the past 12 months, has lack of transportation kept you from medical appointments or from getting medications? no   In the past 12 months, has lack of transportation kept you from meetings, work, or from getting things needed for daily living? No   Patient Choice   Provider Choice list and CMS website (https://medicare.gov/care-compare#search) for post-acute Quality and Resource Measure Data were provided and reviewed with: Patient   Patient / Family choosing to utilize agency / facility established prior to hospitalization No  (Pt declined home health care)     Met with pt and introduced myself as care coordinator with Care Transitions Team for discharge planning. Pt admitted with c/o abdominal pain c/f pancreatitis, and is planned for diagnostic procedure with IR today, team plans to advance diet as tolerated. Pt stated she feels safe at home. Pt denies any falls. Pt's address, phone number, and contact information was verified. Pt denies any social or financial  concerns at this time.    Home care: none.    DME: none.     : none.  PCP:Luciano Cherry MD  Last appt: A month ago Transport to appts: Pt drives herself.  Pharm: [facility] in [place]  (denies issues affording/obtaining medications)    Discharge Planning: Pt made aware PT is recommending low intensity therapy post discharge. Pt declined home PT/OT and outpatient therapy because she feels she does not need it. Pt stated she knows PT exercises she can do at home to keep up the momentum. Pt voiced no discharge needs at this time. Medical team updated of above. Care coordinator will continue to follow for discharge planning needs.    Silver Mckeon RN  Transitional Care Coordinator/TCC  [id number]

## 2024-01-23 NOTE — SIGNIFICANT EVENT
RADAR AUTO PAGE     01/22/24 2126   Onset Documentation   Rapid Response Initiated By Radar auto page   [place] [facility]   [place]ager Time 2125   Arrival Time 2135   Event End Time 2145   Level II Called No   Primary Reason for Call Radar auto page       Vitals:    01/22/24 0449 01/22/24 1351 01/22/24 2125 01/22/24 2135   BP: 121/88 (!) 128/92 98/70 90/64   Pulse: 88 83 89    Resp:  19 18    Temp: 36.6 °C (97.9 °F) 36.4 °C (97.5 °F) 35 °C (95 °F) 36.5 °C (97.7 °F)   TempSrc:    Temporal   SpO2: 97% 98% 94%    Weight:       Height:          Rapid Response RN Note    Rapid response RN at bedside for RADAR score of: 6 @2125. This RN verified and rechecked above VS with bedside RN.  No further concerns at this time.    Please page Rapid Response for any further acute rapid needs!

## 2024-01-24 LAB
ALBUMIN SERPL BCP-MCNC: 3.2 G/DL (ref 3.4–5)
ALP SERPL-CCNC: 58 U/L (ref 33–136)
ALT SERPL W P-5'-P-CCNC: 5 U/L (ref 7–45)
ANION GAP SERPL CALC-SCNC: 14 MMOL/L (ref 10–20)
AST SERPL W P-5'-P-CCNC: 9 U/L (ref 9–39)
BASOPHILS # BLD AUTO: 0.02 X10*3/UL (ref 0–0.1)
BASOPHILS NFR BLD AUTO: 0.4 %
BILIRUB DIRECT SERPL-MCNC: 0 MG/DL (ref 0–0.3)
BILIRUB SERPL-MCNC: 0.2 MG/DL (ref 0–1.2)
BUN SERPL-MCNC: 17 MG/DL (ref 6–23)
CALCIUM SERPL-MCNC: 9.6 MG/DL (ref 8.6–10.6)
CHLORIDE SERPL-SCNC: 103 MMOL/L (ref 98–107)
CO2 SERPL-SCNC: 28 MMOL/L (ref 21–32)
CREAT SERPL-MCNC: 0.8 MG/DL (ref 0.5–1.05)
EGFRCR SERPLBLD CKD-EPI 2021: 81 ML/MIN/1.73M*2
EOSINOPHIL # BLD AUTO: 0.09 X10*3/UL (ref 0–0.7)
EOSINOPHIL NFR BLD AUTO: 1.8 %
ERYTHROCYTE [DISTWIDTH] IN BLOOD BY AUTOMATED COUNT: 13.6 % (ref 11.5–14.5)
GLUCOSE BLD MANUAL STRIP-MCNC: 142 MG/DL (ref 74–99)
GLUCOSE SERPL-MCNC: 87 MG/DL (ref 74–99)
HCT VFR BLD AUTO: 38.9 % (ref 36–46)
HGB BLD-MCNC: 12.7 G/DL (ref 12–16)
IMM GRANULOCYTES # BLD AUTO: 0.01 X10*3/UL (ref 0–0.7)
IMM GRANULOCYTES NFR BLD AUTO: 0.2 % (ref 0–0.9)
LYMPHOCYTES # BLD AUTO: 2.26 X10*3/UL (ref 1.2–4.8)
LYMPHOCYTES NFR BLD AUTO: 45.1 %
MAGNESIUM SERPL-MCNC: 1.75 MG/DL (ref 1.6–2.4)
MCH RBC QN AUTO: 33.8 PG (ref 26–34)
MCHC RBC AUTO-ENTMCNC: 32.6 G/DL (ref 32–36)
MCV RBC AUTO: 104 FL (ref 80–100)
MONOCYTES # BLD AUTO: 0.48 X10*3/UL (ref 0.1–1)
MONOCYTES NFR BLD AUTO: 9.6 %
NEUTROPHILS # BLD AUTO: 2.15 X10*3/UL (ref 1.2–7.7)
NEUTROPHILS NFR BLD AUTO: 42.9 %
NRBC BLD-RTO: 0 /100 WBCS (ref 0–0)
PHOSPHATE SERPL-MCNC: 4.5 MG/DL (ref 2.5–4.9)
PLATELET # BLD AUTO: 326 X10*3/UL (ref 150–450)
POTASSIUM SERPL-SCNC: 4 MMOL/L (ref 3.5–5.3)
PROT SERPL-MCNC: 6 G/DL (ref 6.4–8.2)
RBC # BLD AUTO: 3.76 X10*6/UL (ref 4–5.2)
SODIUM SERPL-SCNC: 141 MMOL/L (ref 136–145)
WBC # BLD AUTO: 5 X10*3/UL (ref 4.4–11.3)

## 2024-01-24 PROCEDURE — 97116 GAIT TRAINING THERAPY: CPT | Mod: GP,CQ

## 2024-01-24 PROCEDURE — 2500000001 HC RX 250 WO HCPCS SELF ADMINISTERED DRUGS (ALT 637 FOR MEDICARE OP)

## 2024-01-24 PROCEDURE — 84100 ASSAY OF PHOSPHORUS: CPT

## 2024-01-24 PROCEDURE — 36415 COLL VENOUS BLD VENIPUNCTURE: CPT

## 2024-01-24 PROCEDURE — 85025 COMPLETE CBC W/AUTO DIFF WBC: CPT

## 2024-01-24 PROCEDURE — 80053 COMPREHEN METABOLIC PANEL: CPT

## 2024-01-24 PROCEDURE — 2500000001 HC RX 250 WO HCPCS SELF ADMINISTERED DRUGS (ALT 637 FOR MEDICARE OP): Performed by: STUDENT IN AN ORGANIZED HEALTH CARE EDUCATION/TRAINING PROGRAM

## 2024-01-24 PROCEDURE — 1210000001 HC SEMI-PRIVATE ROOM DAILY

## 2024-01-24 PROCEDURE — 2500000004 HC RX 250 GENERAL PHARMACY W/ HCPCS (ALT 636 FOR OP/ED)

## 2024-01-24 PROCEDURE — 97110 THERAPEUTIC EXERCISES: CPT | Mod: GP,CQ

## 2024-01-24 PROCEDURE — 99223 1ST HOSP IP/OBS HIGH 75: CPT | Performed by: STUDENT IN AN ORGANIZED HEALTH CARE EDUCATION/TRAINING PROGRAM

## 2024-01-24 PROCEDURE — 82248 BILIRUBIN DIRECT: CPT

## 2024-01-24 PROCEDURE — 82947 ASSAY GLUCOSE BLOOD QUANT: CPT

## 2024-01-24 PROCEDURE — 83735 ASSAY OF MAGNESIUM: CPT

## 2024-01-24 PROCEDURE — 99233 SBSQ HOSP IP/OBS HIGH 50: CPT

## 2024-01-24 RX ORDER — HYDROMORPHONE HYDROCHLORIDE 1 MG/ML
0.2 INJECTION, SOLUTION INTRAMUSCULAR; INTRAVENOUS; SUBCUTANEOUS ONCE
Status: COMPLETED | OUTPATIENT
Start: 2024-01-24 | End: 2024-01-24

## 2024-01-24 RX ORDER — NALOXONE HYDROCHLORIDE 0.4 MG/ML
0.2 INJECTION, SOLUTION INTRAMUSCULAR; INTRAVENOUS; SUBCUTANEOUS EVERY 5 MIN PRN
Status: DISCONTINUED | OUTPATIENT
Start: 2024-01-24 | End: 2024-01-26 | Stop reason: HOSPADM

## 2024-01-24 RX ORDER — GABAPENTIN 300 MG/1
300 CAPSULE ORAL 3 TIMES DAILY
Status: DISCONTINUED | OUTPATIENT
Start: 2024-01-25 | End: 2024-01-26 | Stop reason: HOSPADM

## 2024-01-24 RX ADMIN — ACETAMINOPHEN 650 MG: 325 TABLET ORAL at 06:04

## 2024-01-24 RX ADMIN — HYDROMORPHONE HYDROCHLORIDE 0.2 MG: 1 INJECTION, SOLUTION INTRAMUSCULAR; INTRAVENOUS; SUBCUTANEOUS at 17:47

## 2024-01-24 RX ADMIN — HYDROMORPHONE HYDROCHLORIDE 0.2 MG: 1 INJECTION, SOLUTION INTRAMUSCULAR; INTRAVENOUS; SUBCUTANEOUS at 21:44

## 2024-01-24 RX ADMIN — DICYCLOMINE HYDROCHLORIDE 10 MG: 10 CAPSULE ORAL at 06:04

## 2024-01-24 RX ADMIN — HYDROMORPHONE HYDROCHLORIDE 0.2 MG: 1 INJECTION, SOLUTION INTRAMUSCULAR; INTRAVENOUS; SUBCUTANEOUS at 13:34

## 2024-01-24 RX ADMIN — ACETAMINOPHEN 650 MG: 325 TABLET ORAL at 13:19

## 2024-01-24 RX ADMIN — DICYCLOMINE HYDROCHLORIDE 10 MG: 10 CAPSULE ORAL at 20:50

## 2024-01-24 RX ADMIN — ACETAMINOPHEN 650 MG: 325 TABLET ORAL at 17:14

## 2024-01-24 RX ADMIN — GABAPENTIN 200 MG: 100 CAPSULE ORAL at 08:09

## 2024-01-24 RX ADMIN — ACETAMINOPHEN 650 MG: 325 TABLET ORAL at 21:45

## 2024-01-24 RX ADMIN — HYDROMORPHONE HYDROCHLORIDE 0.2 MG: 1 INJECTION, SOLUTION INTRAMUSCULAR; INTRAVENOUS; SUBCUTANEOUS at 08:07

## 2024-01-24 RX ADMIN — ARIPIPRAZOLE 2 MG: 2 TABLET ORAL at 20:50

## 2024-01-24 RX ADMIN — OXYCODONE HYDROCHLORIDE 5 MG: 5 TABLET ORAL at 17:14

## 2024-01-24 RX ADMIN — BUPROPION HYDROCHLORIDE 150 MG: 150 TABLET, FILM COATED, EXTENDED RELEASE ORAL at 08:09

## 2024-01-24 RX ADMIN — OXYCODONE HYDROCHLORIDE 5 MG: 5 TABLET ORAL at 11:12

## 2024-01-24 RX ADMIN — SENNOSIDES AND DOCUSATE SODIUM 1 TABLET: 8.6; 5 TABLET ORAL at 20:50

## 2024-01-24 RX ADMIN — GABAPENTIN 200 MG: 100 CAPSULE ORAL at 20:50

## 2024-01-24 RX ADMIN — HYDROMORPHONE HYDROCHLORIDE 0.2 MG: 1 INJECTION, SOLUTION INTRAMUSCULAR; INTRAVENOUS; SUBCUTANEOUS at 20:42

## 2024-01-24 RX ADMIN — MELATONIN 6 MG: 3 TAB ORAL at 20:50

## 2024-01-24 RX ADMIN — DICYCLOMINE HYDROCHLORIDE 10 MG: 10 CAPSULE ORAL at 17:14

## 2024-01-24 RX ADMIN — THIAMINE HCL TAB 100 MG 100 MG: 100 TAB at 08:09

## 2024-01-24 RX ADMIN — DICYCLOMINE HYDROCHLORIDE 10 MG: 10 CAPSULE ORAL at 13:21

## 2024-01-24 RX ADMIN — OXYCODONE HYDROCHLORIDE 5 MG: 5 TABLET ORAL at 23:11

## 2024-01-24 RX ADMIN — SERTRALINE HYDROCHLORIDE 100 MG: 100 TABLET ORAL at 08:09

## 2024-01-24 RX ADMIN — TRAZODONE HYDROCHLORIDE 100 MG: 50 TABLET ORAL at 20:50

## 2024-01-24 ASSESSMENT — PAIN SCALES - GENERAL
PAINLEVEL_OUTOF10: 9
PAINLEVEL_OUTOF10: 5 - MODERATE PAIN
PAINLEVEL_OUTOF10: 9

## 2024-01-24 ASSESSMENT — COGNITIVE AND FUNCTIONAL STATUS - GENERAL
TURNING FROM BACK TO SIDE WHILE IN FLAT BAD: A LITTLE
MOVING FROM LYING ON BACK TO SITTING ON SIDE OF FLAT BED WITH BEDRAILS: A LITTLE
MOVING TO AND FROM BED TO CHAIR: A LITTLE
STANDING UP FROM CHAIR USING ARMS: A LITTLE
MOBILITY SCORE: 18
CLIMB 3 TO 5 STEPS WITH RAILING: A LITTLE
WALKING IN HOSPITAL ROOM: A LITTLE

## 2024-01-24 ASSESSMENT — PAIN - FUNCTIONAL ASSESSMENT
PAIN_FUNCTIONAL_ASSESSMENT: 0-10

## 2024-01-24 NOTE — PROGRESS NOTES
"Cece Massey is a 68 y.o. female on day 5 of admission presenting with Choledocholithiasis.    Subjective   No acute overnight events.  Patient continues to complain of severe abdominal pain still requiring multiple doses of as needed oxycodone and IV Dilaudid over the past day.  She tolerated full liquid diet yesterday and feels that she is ready to advance her diet.  She was kept n.p.o. today for a cholangiogram procedure with IR however after further discussion with the IR team it was decided that there would be no benefit in doing the percutaneous cholangiogram.  Patient denies chest pains, shortness of breath, fever/chills.       Objective     Physical Exam  Constitutional:       Appearance: She is not ill-appearing.   HENT:      Mouth/Throat:      Mouth: Mucous membranes are dry.   Cardiovascular:      Rate and Rhythm: Normal rate and regular rhythm.      Heart sounds: Normal heart sounds.   Pulmonary:      Breath sounds: Normal breath sounds.   Abdominal:      General: Abdomen is flat. There is no distension.      Tenderness: There is abdominal tenderness (generalized abdominal pain w/ bilateral back pain). There is guarding. There is no rebound.   Musculoskeletal:      Right lower leg: No edema.      Left lower leg: No edema.   Neurological:      General: No focal deficit present.      Mental Status: She is oriented to person, place, and time.     Last Recorded Vitals  Blood pressure 131/90, pulse 101, temperature 36.5 °C (97.7 °F), resp. rate 17, height 1.676 m (5' 6\"), weight 50.8 kg (112 lb), SpO2 94 %.  Intake/Output last 3 Shifts:  I/O last 3 completed shifts:  In: 1320 (26 mL/kg) [P.O.:1320]  Out: 4 (0.1 mL/kg) [Urine:4 (0 mL/kg/hr)]  Weight: 50.8 kg     Relevant Results  Scheduled medications  acetaminophen, 650 mg, oral, 4x daily   Or  acetaminophen, 650 mg, nasogastric tube, 4x daily   Or  acetaminophen, 650 mg, rectal, 4x daily  ARIPiprazole, 2 mg, oral, Nightly  buPROPion XL, 150 mg, oral, " Daily  dicyclomine, 10 mg, oral, 4x daily  enoxaparin, 40 mg, subcutaneous, q24h  gabapentin, 200 mg, oral, BID  melatonin, 6 mg, oral, Nightly  polyethylene glycol, 17 g, oral, BID  sennosides-docusate sodium, 1 tablet, oral, Nightly  sertraline, 100 mg, oral, Daily  [Held by provider] sucralfate, 1 g, oral, TID with meals  thiamine, 100 mg, oral, Daily  traZODone, 100 mg, oral, Nightly      Continuous medications     PRN medications  PRN medications: HYDROmorphone, oxyCODONE        Assessment/Plan   Principal Problem:    Choledocholithiasis    Ms. Massey is a 68 yo F with h/o Bilroth II for perforated gastric ulcer, cholecystectomy, tobacco use disorder, chronic back pain, anxiety and depression who presented to an OSH with n/v, abd pain with labs showing lipase >800, consistent with acute pancreatitis, transferred to [facility] for possible ERCP. Procedure was done at 1/19 but was aborted due to difficult anatomy. Currently managing supportively and placed NPO after midnight for possible procedure with advanced endoscopy.         Updates  - Cholangiogram with IR today was canceled as the IR team did not see any strong indication for the procedure.  See below note.  -Add gabapentin 200 mg twice daily.  Plan to consult pain management for ongoing severe abdominal pain.  -Advance to regular diet       #Acute pancreatitis  #Pancreatic duct dilation  #Pancreatic cysts  :: MRI/MRCP at OSH (1/16): Several small pancreatic cystic lesions measuring up to 10 x 6 mm at the pancreatic tail. Additional smaller lesions (measuring up to 6 x 6 mm) and punctate cystic lesions towards the pancreatic body and head/uncinate. These may potentially communicate with the main pancreatic duct.   - Given cysts are <1cm, cysts should be monitored with repeat imaging in 12 months  -Was planned for percutaneous cholangiogram on 1-, however after further discussion with the IR team they did not believe ductal dilation seen on MRCP was due  to ductal stenosis or obstruction and ultrasound prior to the procedure was not concerning for any significant ductal dilation.  Thus, the decision was made to cancel the IR guided cholangiogram.  Plan  - Advance to regular diet w/ 70 g fat restriction.  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

## 2024-01-24 NOTE — CONSULTS
Consults  Acute Pain Service  Cece Massey is a 68 y.o. year old female patient with PMH of Bilroth II for perforated gastric ulcer (2017), GJ ulcers, chronic pancreatitis, chronic lower back pain s/p spine surgeries, who presented to OSH for abdominal pain. The patient was diagnosed with acute on chronic pancreatitis and was transferred to [facility] for potential ERCP given findings on MRI c/f choledocholithiasis. Acute pain consulted for management of pain.    The patient complains of constant abdominal pain, sharp in nature and diffuse in distribution, radiating to the back, 8-9/10 in intensity, with no specific aggravating factors. Reliving factors include tylenol and dilaudid. The patient admits to having a healthy appetite and a desire to go home. She denies any nausea, vomiting, chest  pain, fever, chills, recent change in bowel or bladder habits.    PMH:  History reviewed. No pertinent past medical history.    PSH:  Past Surgical History:   Procedure Laterality Date    BILROTH II PROCEDURE      CHOLECYSTECTOMY      OTHER SURGICAL HISTORY N/A     3 lumbar surgeries    OTHER SURGICAL HISTORY      cervical spine surgery       FHx:  No family history on file.    SHx:  Social History     Socioeconomic History    Marital status:      Spouse name: None    Number of children: None    Years of education: None    Highest education level: None   Occupational History    None   Tobacco Use    Smoking status: Every Day     Packs/day: 1.00     Years: 29.00     Additional pack years: 0.00     Total pack years: 29.00     Types: Cigarettes    Smokeless tobacco: Never   Substance and Sexual Activity    Alcohol use: Yes     Alcohol/week: 2.0 standard drinks of alcohol     Types: 2 Shots of liquor per week    Drug use: Never    Sexual activity: None   Other Topics Concern    None   Social History Narrative    None     Social Determinants of Health     Financial Resource Strain: Low Risk  (1/23/2024)    Overall Financial  "Resource Strain (CARDIA)     Difficulty of Paying Living Expenses: Not hard at all   Food Insecurity: Not on file   Transportation Needs: No Transportation Needs (1/23/2024)    PRAPARE - Transportation     Lack of Transportation (Medical): No     Lack of Transportation (Non-Medical): No   Physical Activity: Not on file   Stress: Not on file   Social Connections: Not on file   Intimate Partner Violence: Not on file   Housing Stability: Low Risk  (1/23/2024)    Housing Stability Vital Sign     Unable to Pay for Housing in the Last Year: No     Number of Places Lived in the Last Year: 1     Unstable Housing in the Last Year: No       Allergies:   Allergies   Allergen Reactions    Lisinopril Unknown     Other reaction(s): Other (See Comments)    Metoprolol Unknown     Other reaction(s): Other (See Comments)    Naproxen Nausea And Vomiting and Other     Dry Heavies     Other reaction(s): Other: See Comments   Dry Heavies    Pt reports \"dry heaving\"     Review of Systems  Gen: No fatigue, anorexia, insomnia, fever.   Eyes: No vision loss, double vision, drainage, eye pain.   ENT: No pharyngitis, dry mouth, no hearing changes or ear discharge  Cardiac: No chest pain, palpitations, syncope, near syncope.   Pulmonary: No shortness of breath, cough, hemoptysis.   Heme/lymph: No swollen glands, fever, bleeding.   GI: Abdominal pain +, No change in bowel habits, melena, hematemesis, hematochezia, nausea, vomiting, diarrhea.   : No discharge, dysuria, frequency, urgency, hematuria.  Endo: No polyuria or weight loss.   Musculoskeletal: Negative for any pain or loss of ROM/weakness  Skin: No rashes or lesions  Neuro: Normal speech, no numbness or weakness. No gait difficulties  Review of systems is otherwise negative unless stated above or in history of present illness.    Physical Exam:  Constitutional:  no distress, alert and cooperative, patient resting comfortably in bed upon presentation   Eyes: clear sclera  Head/Neck: No " apparent injury, trachea midline  Respiratory/Thorax: Patent airways, thorax symmetric, breathing comfortably  Cardiovascular: no pitting edema  Gastrointestinal: Nondistended, no pain to deep palpation, midline scar from prior surgery present.   Musculoskeletal: ROM intact  Extremities: no clubbing  Neurological: alert, funes x4  Psychological: Appropriate affect    Results for orders placed or performed during the hospital encounter of 01/18/24 (from the past 24 hour(s))   Magnesium   Result Value Ref Range    Magnesium 1.75 1.60 - 2.40 mg/dL   Hepatic Function Panel   Result Value Ref Range    Albumin 3.2 (L) 3.4 - 5.0 g/dL    Bilirubin, Total 0.2 0.0 - 1.2 mg/dL    Bilirubin, Direct 0.0 0.0 - 0.3 mg/dL    Alkaline Phosphatase 58 33 - 136 U/L    ALT 5 (L) 7 - 45 U/L    AST 9 9 - 39 U/L    Total Protein 6.0 (L) 6.4 - 8.2 g/dL   CBC and Auto Differential   Result Value Ref Range    WBC 5.0 4.4 - 11.3 x10*3/uL    nRBC 0.0 0.0 - 0.0 /100 WBCs    RBC 3.76 (L) 4.00 - 5.20 x10*6/uL    Hemoglobin 12.7 12.0 - 16.0 g/dL    Hematocrit 38.9 36.0 - 46.0 %     (H) 80 - 100 fL    MCH 33.8 26.0 - 34.0 pg    MCHC 32.6 32.0 - 36.0 g/dL    RDW 13.6 11.5 - 14.5 %    Platelets 326 150 - 450 x10*3/uL    Neutrophils % 42.9 40.0 - 80.0 %    Immature Granulocytes %, Automated 0.2 0.0 - 0.9 %    Lymphocytes % 45.1 13.0 - 44.0 %    Monocytes % 9.6 2.0 - 10.0 %    Eosinophils % 1.8 0.0 - 6.0 %    Basophils % 0.4 0.0 - 2.0 %    Neutrophils Absolute 2.15 1.20 - 7.70 x10*3/uL    Immature Granulocytes Absolute, Automated 0.01 0.00 - 0.70 x10*3/uL    Lymphocytes Absolute 2.26 1.20 - 4.80 x10*3/uL    Monocytes Absolute 0.48 0.10 - 1.00 x10*3/uL    Eosinophils Absolute 0.09 0.00 - 0.70 x10*3/uL    Basophils Absolute 0.02 0.00 - 0.10 x10*3/uL   Phosphorus   Result Value Ref Range    Phosphorus 4.5 2.5 - 4.9 mg/dL   Basic Metabolic Panel   Result Value Ref Range    Glucose 87 74 - 99 mg/dL    Sodium 141 136 - 145 mmol/L    Potassium 4.0 3.5 -  5.3 mmol/L    Chloride 103 98 - 107 mmol/L    Bicarbonate 28 21 - 32 mmol/L    Anion Gap 14 10 - 20 mmol/L    Urea Nitrogen 17 6 - 23 mg/dL    Creatinine 0.80 0.50 - 1.05 mg/dL    eGFR 81 >60 mL/min/1.73m*2    Calcium 9.6 8.6 - 10.6 mg/dL   POCT GLUCOSE   Result Value Ref Range    POCT Glucose 142 (H) 74 - 99 mg/dL      Cece Massey is a 68 y.o. year old female patient  [ ]      Results for orders placed or performed during the hospital encounter of 01/18/24 (from the past 24 hour(s))   Magnesium   Result Value Ref Range    Magnesium 1.75 1.60 - 2.40 mg/dL   Hepatic Function Panel   Result Value Ref Range    Albumin 3.2 (L) 3.4 - 5.0 g/dL    Bilirubin, Total 0.2 0.0 - 1.2 mg/dL    Bilirubin, Direct 0.0 0.0 - 0.3 mg/dL    Alkaline Phosphatase 58 33 - 136 U/L    ALT 5 (L) 7 - 45 U/L    AST 9 9 - 39 U/L    Total Protein 6.0 (L) 6.4 - 8.2 g/dL   CBC and Auto Differential   Result Value Ref Range    WBC 5.0 4.4 - 11.3 x10*3/uL    nRBC 0.0 0.0 - 0.0 /100 WBCs    RBC 3.76 (L) 4.00 - 5.20 x10*6/uL    Hemoglobin 12.7 12.0 - 16.0 g/dL    Hematocrit 38.9 36.0 - 46.0 %     (H) 80 - 100 fL    MCH 33.8 26.0 - 34.0 pg    MCHC 32.6 32.0 - 36.0 g/dL    RDW 13.6 11.5 - 14.5 %    Platelets 326 150 - 450 x10*3/uL    Neutrophils % 42.9 40.0 - 80.0 %    Immature Granulocytes %, Automated 0.2 0.0 - 0.9 %    Lymphocytes % 45.1 13.0 - 44.0 %    Monocytes % 9.6 2.0 - 10.0 %    Eosinophils % 1.8 0.0 - 6.0 %    Basophils % 0.4 0.0 - 2.0 %    Neutrophils Absolute 2.15 1.20 - 7.70 x10*3/uL    Immature Granulocytes Absolute, Automated 0.01 0.00 - 0.70 x10*3/uL    Lymphocytes Absolute 2.26 1.20 - 4.80 x10*3/uL    Monocytes Absolute 0.48 0.10 - 1.00 x10*3/uL    Eosinophils Absolute 0.09 0.00 - 0.70 x10*3/uL    Basophils Absolute 0.02 0.00 - 0.10 x10*3/uL   Phosphorus   Result Value Ref Range    Phosphorus 4.5 2.5 - 4.9 mg/dL   Basic Metabolic Panel   Result Value Ref Range    Glucose 87 74 - 99 mg/dL    Sodium 141 136 - 145 mmol/L     Potassium 4.0 3.5 - 5.3 mmol/L    Chloride 103 98 - 107 mmol/L    Bicarbonate 28 21 - 32 mmol/L    Anion Gap 14 10 - 20 mmol/L    Urea Nitrogen 17 6 - 23 mg/dL    Creatinine 0.80 0.50 - 1.05 mg/dL    eGFR 81 >60 mL/min/1.73m*2    Calcium 9.6 8.6 - 10.6 mg/dL   POCT GLUCOSE   Result Value Ref Range    POCT Glucose 142 (H) 74 - 99 mg/dL      Assessment     Cece Massey is a 68 y.o. year old female patient with PMH of Bilroth II for perforated gastric ulcer (2017), GJ ulcers, chronic pancreatitis, chronic lower back pain s/p spine surgeries, who presented to OSH for abdominal pain. The patient was diagnosed with acute on chronic pancreatitis and was transferred to [facility] for potential ERCP given findings on MRI c/f choledocholithiasis. Acute pain consulted for management of pain.    The patient complains of constant abdominal pain, sharp in nature and diffuse in distribution, radiating to the back, 8-9/10 in intensity, with no specific aggravating factors. Reliving factors include tylenol and dilaudid. The patient admits to having a healthy appetite and a desire to go home. She denies any nausea, vomiting, chest  pain, fever, chills, recent change in bowel or bladder habits.    Current medications include oxycodone, dilaudid, tylenol, gabapentin 200 mg, bupropion, aripiprazole, trazadone, sertraline.    On physical exam the patient was resting comfortably in bed in no acute distress. Nondistended abdomen with midline scarring present from previous surgery. Minimal pain to deep palpation of abdomen.     Acute pancreatitis     PLAN  - Can increase gabapentin to 300 mg TID and further to 600 mg TID if tolerated.   - Can consider toradol IV  - Continue tylenol  - Continue antidepressants  - Patient may benefit from chronic pain evaluation as outpatient.     Acute Pain Resident  pg [phone] ph [phone]

## 2024-01-24 NOTE — PROGRESS NOTES
Physical Therapy    Physical Therapy Treatment    Patient Name: Cece Massey  MRN: [id number]  Today's Date: 1/24/2024  Time Calculation  Start Time: 0823  Stop Time: 0846  Time Calculation (min): 23 min       Assessment/Plan   PT Assessment  Rehab Prognosis: Good  Evaluation/Treatment Tolerance: Patient tolerated treatment well  End of Session Communication: Bedside nurse  End of Session Patient Position: Bed, 2 rail up  PT Plan  Inpatient/Swing Bed or Outpatient: Inpatient  PT Plan  Treatment/Interventions: Bed mobility, Transfer training, Gait training, Stair training, Balance training, Strengthening, Endurance training, Therapeutic exercise, Therapeutic activity, Home exercise program  PT Plan: Skilled PT  PT Frequency: 3 times per week  PT Discharge Recommendations: Low intensity level of continued care  Equipment Recommended upon Discharge:  (Pt. may benefit from rollator secondary to limited standing tolerance.)  PT Recommended Transfer Status: Assist x1  PT - OK to Discharge: Yes (PT evaluation complete and rehab recommendations made.)      General Visit Information:   PT  Visit  PT Received On: 01/24/24  General  Reason for Referral: transferred to [facility] for potential ERCP given findings on MRI c/f choledocholithiasis  Past Medical History Relevant to Rehab: PMHx/PSHx significant for Bilroth II for perforated gastric ulcer, GJ ulcers, labeled to have chronic pancreatitis, chronic lower back pain s/p 3 surgeries  Family/Caregiver Present: No  Prior to Session Communication: Bedside nurse  General Comment: Pt. supine in bed upon arrival. Pt. reports discomfort in abdomen and low back but reports recently receiving Dilaudid. Pt. willing to participate.    Subjective   Precautions:  Precautions  Medical Precautions: Fall precautions  Vital Signs:       Objective   Pain:  Pain Assessment  Pain Assessment: 0-10  Pain Score: 5 - Moderate pain  Pain Interventions: Medication (See MAR)  Cognition:  Cognition  Overall  Cognitive Status: Within Functional Limits  Postural Control:  Postural Control  Postural Control: Impaired  Static Sitting Balance  Static Sitting-Balance Support: Feet supported  Static Standing Balance  Static Standing-Balance Support: No upper extremity supported  Static Standing-Level of Assistance: Contact guard  Extremity/Trunk Assessments:                      Activity Tolerance:  Activity Tolerance  Endurance: Decreased tolerance for upright activites  Treatments:  Therapeutic Exercise  Therapeutic Exercise Performed: Yes  Therapeutic Exercise Activity 1: Pt. performed standing bilat le ex MARCHES AND HEEL RAISES X 15 REPS. Unilateral ue support - hand flat on tray table to work on balance.    Bed Mobility  Bed Mobility: Yes  Bed Mobility 1  Bed Mobility 1: Supine to sitting  Level of Assistance 1: Distant supervision    Ambulation/Gait Training  Ambulation/Gait Training Performed: Yes  Ambulation/Gait Training 1  Surface 1:  (Pt. amb. 150' no device and close sba/light cga - Pt. remains kyphotic with rounded shoulders thru out walk- No LOB.)  Transfers  Transfer: Yes  Transfer 1  Transfer From 1: Sit to  Transfer to 1: Stand  Technique 1: Stand to sit  Transfer Level of Assistance 1:  (SBA)    Stairs  Stairs: Yes  Stairs  Rails 1:  (Pt. ascended and descended 5 steps with rail on right with hand held cga - Instructed to take one step at a time (non reciprocal) for safety.)    Outcome Measures:  [facility] Basic Mobility  Turning from your back to your side while in a flat bed without using bedrails: A little  Moving from lying on your back to sitting on the side of a flat bed without using bedrails: A little  Moving to and from bed to chair (including a wheelchair): A little  Standing up from a chair using your arms (e.g. wheelchair or bedside chair): A little  To walk in hospital room: A little  Climbing 3-5 steps with railing: A little  Basic Mobility - Total Score: 18    Education Documentation  Body  Mechanics, taught by Una Anderson PTA at 1/24/2024  9:05 AM.  Learner: Patient  Readiness: Acceptance  Method: Explanation, Demonstration  Response: Needs Reinforcement    Mobility Training, taught by Una Anderson PTA at 1/24/2024  9:05 AM.  Learner: Patient  Readiness: Acceptance  Method: Explanation, Demonstration  Response: Needs Reinforcement    Education Comments  No comments found.        OP EDUCATION:       Encounter Problems       Encounter Problems (Active)       PT Problem       Pt. will be independent with transfers with LRD  (Progressing)       Start:  01/18/24    Expected End:  01/22/24            Pt. will ambulate > 150 ft. with LRD for safe household ambulation  (Met)       Start:  01/18/24    Expected End:  01/22/24    Resolved:  01/24/24         Pt. will ascend/descend 3 steps with 1 handrail with SBA to safely enter/exit the home set up  (Progressing)       Start:  01/18/24    Expected End:  01/22/24            Pt. will be independent with B LE strengthening therex program.  (Progressing)       Start:  01/18/24    Expected End:  01/22/24

## 2024-01-24 NOTE — CARE PLAN
The patient's goals for the shift include    Problem: Pain  Goal: My pain/discomfort is manageable  Outcome: Progressing     Problem: Safety  Goal: Patient will be injury free during hospitalization  Outcome: Progressing  Goal: I will remain free of falls  Outcome: Progressing     Problem: Daily Care  Goal: Daily care needs are met  Outcome: Progressing     Problem: Psychosocial Needs  Goal: Demonstrates ability to cope with hospitalization/illness  Outcome: Progressing  Goal: Collaborate with me, my family, and caregiver to identify my specific goals  Outcome: Progressing     Problem: Discharge Barriers  Goal: My discharge needs are met  Outcome: Progressing       The clinical goals for the shift include pt will remain safe and free from injury throughout shift

## 2024-01-25 LAB
ANION GAP SERPL CALC-SCNC: 11 MMOL/L (ref 10–20)
BASOPHILS # BLD AUTO: 0.02 X10*3/UL (ref 0–0.1)
BASOPHILS NFR BLD AUTO: 0.3 %
BUN SERPL-MCNC: 16 MG/DL (ref 6–23)
CALCIUM SERPL-MCNC: 9.3 MG/DL (ref 8.6–10.6)
CHLORIDE SERPL-SCNC: 104 MMOL/L (ref 98–107)
CO2 SERPL-SCNC: 29 MMOL/L (ref 21–32)
CREAT SERPL-MCNC: 0.72 MG/DL (ref 0.5–1.05)
EGFRCR SERPLBLD CKD-EPI 2021: >90 ML/MIN/1.73M*2
EOSINOPHIL # BLD AUTO: 0.1 X10*3/UL (ref 0–0.7)
EOSINOPHIL NFR BLD AUTO: 1.6 %
ERYTHROCYTE [DISTWIDTH] IN BLOOD BY AUTOMATED COUNT: 13.5 % (ref 11.5–14.5)
GLUCOSE SERPL-MCNC: 76 MG/DL (ref 74–99)
HCT VFR BLD AUTO: 37.2 % (ref 36–46)
HGB BLD-MCNC: 11.8 G/DL (ref 12–16)
IMM GRANULOCYTES # BLD AUTO: 0.01 X10*3/UL (ref 0–0.7)
IMM GRANULOCYTES NFR BLD AUTO: 0.2 % (ref 0–0.9)
LYMPHOCYTES # BLD AUTO: 2.52 X10*3/UL (ref 1.2–4.8)
LYMPHOCYTES NFR BLD AUTO: 40.1 %
MAGNESIUM SERPL-MCNC: 1.75 MG/DL (ref 1.6–2.4)
MCH RBC QN AUTO: 33.3 PG (ref 26–34)
MCHC RBC AUTO-ENTMCNC: 31.7 G/DL (ref 32–36)
MCV RBC AUTO: 105 FL (ref 80–100)
MONOCYTES # BLD AUTO: 0.47 X10*3/UL (ref 0.1–1)
MONOCYTES NFR BLD AUTO: 7.5 %
NEUTROPHILS # BLD AUTO: 3.17 X10*3/UL (ref 1.2–7.7)
NEUTROPHILS NFR BLD AUTO: 50.3 %
NRBC BLD-RTO: 0 /100 WBCS (ref 0–0)
PHOSPHATE SERPL-MCNC: 3.7 MG/DL (ref 2.5–4.9)
PLATELET # BLD AUTO: 307 X10*3/UL (ref 150–450)
POTASSIUM SERPL-SCNC: 4 MMOL/L (ref 3.5–5.3)
RBC # BLD AUTO: 3.54 X10*6/UL (ref 4–5.2)
SODIUM SERPL-SCNC: 140 MMOL/L (ref 136–145)
WBC # BLD AUTO: 6.3 X10*3/UL (ref 4.4–11.3)

## 2024-01-25 PROCEDURE — 2500000004 HC RX 250 GENERAL PHARMACY W/ HCPCS (ALT 636 FOR OP/ED)

## 2024-01-25 PROCEDURE — 83735 ASSAY OF MAGNESIUM: CPT

## 2024-01-25 PROCEDURE — 97116 GAIT TRAINING THERAPY: CPT | Mod: GP,CQ

## 2024-01-25 PROCEDURE — 99233 SBSQ HOSP IP/OBS HIGH 50: CPT

## 2024-01-25 PROCEDURE — 97110 THERAPEUTIC EXERCISES: CPT | Mod: GP,CQ

## 2024-01-25 PROCEDURE — 2500000001 HC RX 250 WO HCPCS SELF ADMINISTERED DRUGS (ALT 637 FOR MEDICARE OP)

## 2024-01-25 PROCEDURE — 36415 COLL VENOUS BLD VENIPUNCTURE: CPT

## 2024-01-25 PROCEDURE — 1210000001 HC SEMI-PRIVATE ROOM DAILY

## 2024-01-25 PROCEDURE — 2500000001 HC RX 250 WO HCPCS SELF ADMINISTERED DRUGS (ALT 637 FOR MEDICARE OP): Performed by: STUDENT IN AN ORGANIZED HEALTH CARE EDUCATION/TRAINING PROGRAM

## 2024-01-25 PROCEDURE — 84100 ASSAY OF PHOSPHORUS: CPT

## 2024-01-25 PROCEDURE — 85025 COMPLETE CBC W/AUTO DIFF WBC: CPT

## 2024-01-25 PROCEDURE — 82374 ASSAY BLOOD CARBON DIOXIDE: CPT

## 2024-01-25 RX ORDER — HYDROMORPHONE HYDROCHLORIDE 2 MG/1
1 TABLET ORAL EVERY 6 HOURS
Status: DISCONTINUED | OUTPATIENT
Start: 2024-01-25 | End: 2024-01-25

## 2024-01-25 RX ORDER — HYDROMORPHONE HYDROCHLORIDE 2 MG/1
1 TABLET ORAL EVERY 4 HOURS PRN
Status: DISCONTINUED | OUTPATIENT
Start: 2024-01-25 | End: 2024-01-26 | Stop reason: HOSPADM

## 2024-01-25 RX ORDER — PANTOPRAZOLE SODIUM 40 MG/1
40 TABLET, DELAYED RELEASE ORAL
Status: DISCONTINUED | OUTPATIENT
Start: 2024-01-26 | End: 2024-01-26 | Stop reason: HOSPADM

## 2024-01-25 RX ORDER — OXYCODONE AND ACETAMINOPHEN 5; 325 MG/1; MG/1
1 TABLET ORAL EVERY 6 HOURS PRN
Status: DISCONTINUED | OUTPATIENT
Start: 2024-01-25 | End: 2024-01-26 | Stop reason: HOSPADM

## 2024-01-25 RX ADMIN — SENNOSIDES AND DOCUSATE SODIUM 1 TABLET: 8.6; 5 TABLET ORAL at 20:11

## 2024-01-25 RX ADMIN — THIAMINE HCL TAB 100 MG 100 MG: 100 TAB at 08:02

## 2024-01-25 RX ADMIN — OXYCODONE HYDROCHLORIDE 5 MG: 5 TABLET ORAL at 05:16

## 2024-01-25 RX ADMIN — ENOXAPARIN SODIUM 40 MG: 100 INJECTION SUBCUTANEOUS at 16:45

## 2024-01-25 RX ADMIN — HYDROMORPHONE HYDROCHLORIDE 1 MG: 2 TABLET ORAL at 12:39

## 2024-01-25 RX ADMIN — ACETAMINOPHEN 650 MG: 325 TABLET ORAL at 08:02

## 2024-01-25 RX ADMIN — MELATONIN 6 MG: 3 TAB ORAL at 20:11

## 2024-01-25 RX ADMIN — HYDROMORPHONE HYDROCHLORIDE 0.2 MG: 1 INJECTION, SOLUTION INTRAMUSCULAR; INTRAVENOUS; SUBCUTANEOUS at 03:29

## 2024-01-25 RX ADMIN — TRAZODONE HYDROCHLORIDE 100 MG: 50 TABLET ORAL at 20:11

## 2024-01-25 RX ADMIN — DICYCLOMINE HYDROCHLORIDE 10 MG: 10 CAPSULE ORAL at 16:45

## 2024-01-25 RX ADMIN — DICYCLOMINE HYDROCHLORIDE 10 MG: 10 CAPSULE ORAL at 20:11

## 2024-01-25 RX ADMIN — GABAPENTIN 300 MG: 300 CAPSULE ORAL at 20:11

## 2024-01-25 RX ADMIN — SERTRALINE HYDROCHLORIDE 100 MG: 100 TABLET ORAL at 08:02

## 2024-01-25 RX ADMIN — DICYCLOMINE HYDROCHLORIDE 10 MG: 10 CAPSULE ORAL at 12:40

## 2024-01-25 RX ADMIN — DICYCLOMINE HYDROCHLORIDE 10 MG: 10 CAPSULE ORAL at 08:02

## 2024-01-25 RX ADMIN — HYDROMORPHONE HYDROCHLORIDE 1 MG: 2 TABLET ORAL at 20:10

## 2024-01-25 RX ADMIN — ARIPIPRAZOLE 2 MG: 2 TABLET ORAL at 20:11

## 2024-01-25 RX ADMIN — GABAPENTIN 300 MG: 300 CAPSULE ORAL at 14:02

## 2024-01-25 RX ADMIN — GABAPENTIN 300 MG: 300 CAPSULE ORAL at 08:02

## 2024-01-25 RX ADMIN — HYDROMORPHONE HYDROCHLORIDE 0.2 MG: 1 INJECTION, SOLUTION INTRAMUSCULAR; INTRAVENOUS; SUBCUTANEOUS at 08:03

## 2024-01-25 RX ADMIN — OXYCODONE HYDROCHLORIDE AND ACETAMINOPHEN 1 TABLET: 5; 325 TABLET ORAL at 18:04

## 2024-01-25 RX ADMIN — BUPROPION HYDROCHLORIDE 150 MG: 150 TABLET, FILM COATED, EXTENDED RELEASE ORAL at 08:03

## 2024-01-25 RX ADMIN — HYDROMORPHONE HYDROCHLORIDE 1 MG: 2 TABLET ORAL at 16:43

## 2024-01-25 RX ADMIN — OXYCODONE HYDROCHLORIDE AND ACETAMINOPHEN 1 TABLET: 5; 325 TABLET ORAL at 11:35

## 2024-01-25 ASSESSMENT — PAIN - FUNCTIONAL ASSESSMENT
PAIN_FUNCTIONAL_ASSESSMENT: 0-10

## 2024-01-25 ASSESSMENT — COGNITIVE AND FUNCTIONAL STATUS - GENERAL
WALKING IN HOSPITAL ROOM: A LITTLE
DAILY ACTIVITIY SCORE: 24
MOVING TO AND FROM BED TO CHAIR: A LITTLE
CLIMB 3 TO 5 STEPS WITH RAILING: A LITTLE
STANDING UP FROM CHAIR USING ARMS: A LITTLE
CLIMB 3 TO 5 STEPS WITH RAILING: A LITTLE
STANDING UP FROM CHAIR USING ARMS: A LITTLE
WALKING IN HOSPITAL ROOM: A LITTLE
MOVING FROM LYING ON BACK TO SITTING ON SIDE OF FLAT BED WITH BEDRAILS: A LITTLE
MOBILITY SCORE: 18
MOVING TO AND FROM BED TO CHAIR: A LITTLE
TURNING FROM BACK TO SIDE WHILE IN FLAT BAD: A LITTLE
TURNING FROM BACK TO SIDE WHILE IN FLAT BAD: A LITTLE
MOVING FROM LYING ON BACK TO SITTING ON SIDE OF FLAT BED WITH BEDRAILS: A LITTLE
MOBILITY SCORE: 18

## 2024-01-25 ASSESSMENT — PAIN SCALES - GENERAL
PAINLEVEL_OUTOF10: 8
PAINLEVEL_OUTOF10: 8
PAINLEVEL_OUTOF10: 5 - MODERATE PAIN
PAINLEVEL_OUTOF10: 8
PAINLEVEL_OUTOF10: 6
PAINLEVEL_OUTOF10: 8
PAINLEVEL_OUTOF10: 8
PAINLEVEL_OUTOF10: 7
PAINLEVEL_OUTOF10: 8

## 2024-01-25 ASSESSMENT — PAIN DESCRIPTION - LOCATION
LOCATION: ABDOMEN
LOCATION: ABDOMEN
LOCATION: BACK

## 2024-01-25 NOTE — PROGRESS NOTES
"Physical Therapy    Physical Therapy Treatment    Patient Name: Cece Massey  MRN: [id number]  Today's Date: 1/25/2024  Time Calculation  Start Time: 1048  Stop Time: 1112  Time Calculation (min): 24 min       Assessment/Plan   PT Assessment  Evaluation/Treatment Tolerance: Patient tolerated treatment well  End of Session Communication: Bedside nurse  Assessment Comment: Pt. is a 69 y/o F admitted for ERCP. Pt. presents with abdominal pain, impaired balance, decreased endurance, and difficulty with all functional mobility compared to baseline. Pt. would benefit from skilled PT while IP to address these deficits.  End of Session Patient Position: Bed, 2 rail up  PT Plan  Inpatient/Swing Bed or Outpatient: Inpatient  PT Plan  Treatment/Interventions: Bed mobility, Transfer training, Gait training, Stair training, Balance training, Strengthening, Endurance training, Therapeutic exercise, Therapeutic activity, Home exercise program  PT Plan: Skilled PT  PT Frequency: 3 times per week  PT Discharge Recommendations: Low intensity level of continued care  Equipment Recommended upon Discharge:  (Pt. may benefit from rollator secondary to limited standing tolerance.)  PT Recommended Transfer Status: Assist x1  PT - OK to Discharge: Yes (PT evaluation complete and rehab recommendations made.)      General Visit Information:   PT  Visit  PT Received On: 01/25/24  General  Reason for Referral: transferred to [facility] for potential ERCP given findings on MRI c/f choledocholithiasis  Past Medical History Relevant to Rehab: PMHx/PSHx significant for Bilroth II for perforated gastric ulcer, GJ ulcers, labeled to have chronic pancreatitis, chronic lower back pain s/p 3 surgeries  Family/Caregiver Present: No  Prior to Session Communication: Bedside nurse  General Comment: Pt. sitting EOB upon arrival. Pt. states \"I just want to go home\" Pt. stating the only reason she was here was for surgery and states \"they arent going to do surgery, so " "i want to go home\"    Subjective   Precautions:  Precautions  Medical Precautions: Fall precautions  Vital Signs:       Objective   Pain:  Pain Assessment  Pain Assessment: 0-10  Pain Score: 8  Cognition:  Cognition  Overall Cognitive Status: Within Functional Limits  Postural Control:  Postural Control  Postural Control:  (Kyphosis and rounded shoulders.)  Static Sitting Balance  Static Sitting-Balance Support: Feet supported  Static Sitting-Level of Assistance: Independent  Dynamic Sitting Balance  Dynamic Sitting-Balance Support: Feet supported, Bilateral upper extremity supported  Dynamic Sitting-Balance:  (supervision.)  Static Standing Balance  Static Standing-Balance Support: No upper extremity supported  Static Standing-Level of Assistance: Contact guard  Extremity/Trunk Assessments:                      Activity Tolerance:  Activity Tolerance  Endurance: Decreased tolerance for upright activites (Mostly due to back discomfort.)  Treatments:  Therapeutic Exercise  Therapeutic Exercise Performed: Yes  Therapeutic Exercise Activity 1: Pt. performed standing bilat le ex with hands flat on tray table. Marches, Heel raises and abd x 15 reps.         Ambulation/Gait Training  Ambulation/Gait Training Performed: Yes  Ambulation/Gait Training 1  Surface 1:  (Pt. amb. 150' no device and light cga/hand held assist - No LOB although pt. reports pain in back increasing)  Transfers  Transfer: Yes  Transfer 1  Transfer From 1: Sit to  Transfer to 1: Stand  Technique 1: Stand to sit  Trials/Comments 1: Light CGa to steady    Outcome Measures:  [facility] Basic Mobility  Turning from your back to your side while in a flat bed without using bedrails: A little  Moving from lying on your back to sitting on the side of a flat bed without using bedrails: A little  Moving to and from bed to chair (including a wheelchair): A little  Standing up from a chair using your arms (e.g. wheelchair or bedside chair): A little  To walk in " hospital room: A little  Climbing 3-5 steps with railing: A little  Basic Mobility - Total Score: 18    Education Documentation  Body Mechanics, taught by Una Anderson PTA at 1/25/2024 11:30 AM.  Learner: Patient  Readiness: Acceptance  Method: Explanation, Demonstration  Response: Needs Reinforcement    Mobility Training, taught by Una Anderson PTA at 1/25/2024 11:30 AM.  Learner: Patient  Readiness: Acceptance  Method: Explanation, Demonstration  Response: Needs Reinforcement    Education Comments  No comments found.        OP EDUCATION:       Encounter Problems       Encounter Problems (Active)       PT Problem       Pt. will be independent with transfers with LRD  (Progressing)       Start:  01/18/24    Expected End:  01/22/24            Pt. will ambulate > 150 ft. with LRD for safe household ambulation  (Met)       Start:  01/18/24    Expected End:  01/22/24    Resolved:  01/24/24         Pt. will ascend/descend 3 steps with 1 handrail with SBA to safely enter/exit the home set up  (Progressing)       Start:  01/18/24    Expected End:  01/22/24            Pt. will be independent with B LE strengthening therex program.  (Progressing)       Start:  01/18/24    Expected End:  01/22/24

## 2024-01-25 NOTE — PROGRESS NOTES
Transitional Care Coordination Progress Note:  Patient discussed during interdisciplinary rounds.  Team members present: MD, CRISTIAN  Plan per Medical/Surgical team: Pain management team consulted, team plans to discontinue IV pain meds prior to discharge.  Payer: Medicare A/B, MMO  Status: Inpatient  Discharge disposition: Home  Potential Barriers: none  ADOD: 1/26  Pt aware PT is recommending low intensity therapy but declined home PT/OT and outpatient therapy because she feels she doesn't need it. Medical team updated of above. Care coordinator will continue to follow for discharge planning needs.     Silver Mckeon RN  Transitional Care Coordinator/TCC  [phone]

## 2024-01-25 NOTE — PROGRESS NOTES
"Cece Massey is a 68 y.o. female on day 7 of admission presenting with Choledocholithiasis.    Subjective   No acute overnight events.  Patient continues to complain of 8 out of 10 severe abdominal pain.  She states that the pain is still circumferential around her abdomen.  She is tolerating regular diet with no issues.  Her pain is constant and is not worsened by anything.  She has required a significant amount of pain medications and appears to be asking for her as needed IV Dilaudid and as needed oxycodone at the exact time that they are available.  She has had 7X IV Dilaudid and 4X oxycodone over the past day.  She continues to state that she would like to go home however we cannot send her home while requiring this much IV Dilaudid.  She denies any SOB, CP, N/V, bloody stool.       Objective     Physical Exam  Constitutional:       Appearance: She is not ill-appearing.   HENT:      Mouth/Throat:      Mouth: Mucous membranes are dry.   Cardiovascular:      Rate and Rhythm: Normal rate and regular rhythm.      Heart sounds: Normal heart sounds.   Pulmonary:      Breath sounds: Normal breath sounds.   Abdominal:      General: Abdomen is flat. There is no distension.      Tenderness: There is abdominal tenderness (generalized circumferential abdominal pain w/ bilateral back pain). There is NO guarding. There is no rebound.   Musculoskeletal:      Right lower leg: No edema.      Left lower leg: No edema.   Neurological:      General: No focal deficit present.      Mental Status: She is oriented to person, place, and time.     Last Recorded Vitals  Blood pressure 105/75, pulse 82, temperature 36.7 °C (98.1 °F), temperature source Temporal, resp. rate 20, height 1.676 m (5' 6\"), weight 50.8 kg (112 lb), SpO2 95 %.  Intake/Output last 3 Shifts:  I/O last 3 completed shifts:  In: 600 (11.8 mL/kg) [P.O.:600]  Out: - (0 mL/kg)   Weight: 50.8 kg     Relevant Results  Scheduled medications  ARIPiprazole, 2 mg, oral, " Nightly  buPROPion XL, 150 mg, oral, Daily  dicyclomine, 10 mg, oral, 4x daily  enoxaparin, 40 mg, subcutaneous, q24h  gabapentin, 300 mg, oral, TID  melatonin, 6 mg, oral, Nightly  [START ON 1/26/2024] pantoprazole, 40 mg, oral, Daily before breakfast  polyethylene glycol, 17 g, oral, BID  sennosides-docusate sodium, 1 tablet, oral, Nightly  sertraline, 100 mg, oral, Daily  [Held by provider] sucralfate, 1 g, oral, TID with meals  thiamine, 100 mg, oral, Daily  traZODone, 100 mg, oral, Nightly      Continuous medications     PRN medications  PRN medications: HYDROmorphone, naloxone, oxyCODONE-acetaminophen        Assessment/Plan   Principal Problem:    Choledocholithiasis    Ms. Massey is a 68 yo F with h/o Bilroth II for perforated gastric ulcer, cholecystectomy, tobacco use disorder, chronic back pain, anxiety and depression who presented to an OSH with n/v, abd pain with labs showing lipase >800, consistent with acute pancreatitis, transferred to [facility] for possible ERCP. Procedure was done at 1/19 but was aborted due to difficult anatomy. Currently managing supportively. Pain control consulted for pain management.     Updates  -Continues to complain of significant abdominal pain. Current pain med regimen: Current pain regimen: Tylenol 650mg q4h  (moderate), gabapentin 300 mg TID, dicyclomine 10 mg QID, oxy 5mg-acetaminophen 325 mg q6h PRN (Severe), dilaudid PO 1 mg Q4H PRN for breakthrough pain  -Pain management following       #Acute pancreatitis, likely alcohol-induced given positive Peth  #Pancreatic duct dilation  #Pancreatic cysts  :: MRI/MRCP at OSH (1/16): Several small pancreatic cystic lesions measuring up to 10 x 6 mm at the pancreatic tail. Additional smaller lesions (measuring up to 6 x 6 mm) and punctate cystic lesions towards the pancreatic body and head/uncinate. These may potentially communicate with the main pancreatic duct.   - Given cysts are <1cm, cysts should be monitored with repeat  imaging in 12 months  -Was planned for percutaneous cholangiogram on 1-, however after further discussion with the IR team they did not believe ductal dilation seen on MRCP was due to ductal stenosis or obstruction and ultrasound prior to the procedure was not concerning for any significant ductal dilation.  Thus, the decision was made to cancel the IR guided cholangiogram.  - Peth results positive and indicative of: Heavy alcohol consumption over the past 2-4 weeks or chronic alcohol use  Plan  - Continue regular diet w/ 70 g fat restriction.  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

## 2024-01-25 NOTE — PROGRESS NOTES
"Cece Massey is a 68 y.o. female on day 6 of admission presenting with Choledocholithiasis.    Subjective   No acute overnight events.  Patient seen this a.m. resting comfortably in bed.  Continues to complain of severe 8-9 out of 10 abdominal pain.  Tolerating regular diet with no issues.  Denies N/V, CP, SOB, fever/chills.       Objective   Physical Exam  Constitutional:       Appearance: She is not ill-appearing.   HENT:      Mouth/Throat:      Mouth: Mucous membranes are dry.   Cardiovascular:      Rate and Rhythm: Normal rate and regular rhythm.      Heart sounds: Normal heart sounds.   Pulmonary:      Breath sounds: Normal breath sounds.   Abdominal:      General: Abdomen is flat. There is no distension.      Tenderness: There is abdominal tenderness (generalized circumferential abdominal pain w/ bilateral back pain). There is NO guarding. There is no rebound.   Musculoskeletal:      Right lower leg: No edema.      Left lower leg: No edema.   Neurological:      General: No focal deficit present.      Mental Status: She is oriented to person, place, and time.       Last Recorded Vitals  Blood pressure 136/88, pulse 80, temperature 36.7 °C (98.1 °F), resp. rate 16, height 1.676 m (5' 6\"), weight 50.8 kg (112 lb), SpO2 93 %.  Intake/Output last 3 Shifts:  I/O last 3 completed shifts:  In: 1080 (21.3 mL/kg) [P.O.:1080]  Out: 2 (0 mL/kg) [Urine:2 (0 mL/kg/hr)]  Weight: 50.8 kg     Relevant Results  Scheduled medications  acetaminophen, 650 mg, oral, 4x daily   Or  acetaminophen, 650 mg, nasogastric tube, 4x daily   Or  acetaminophen, 650 mg, rectal, 4x daily  ARIPiprazole, 2 mg, oral, Nightly  buPROPion XL, 150 mg, oral, Daily  dicyclomine, 10 mg, oral, 4x daily  enoxaparin, 40 mg, subcutaneous, q24h  gabapentin, 200 mg, oral, BID  melatonin, 6 mg, oral, Nightly  polyethylene glycol, 17 g, oral, BID  sennosides-docusate sodium, 1 tablet, oral, Nightly  sertraline, 100 mg, oral, Daily  [Held by provider] sucralfate, " 1 g, oral, TID with meals  thiamine, 100 mg, oral, Daily  traZODone, 100 mg, oral, Nightly      Continuous medications     PRN medications  PRN medications: HYDROmorphone, naloxone, oxyCODONE        Assessment/Plan   Principal Problem:    Choledocholithiasis    Ms. Massey is a 68 yo F with h/o Bilroth II for perforated gastric ulcer, cholecystectomy, tobacco use disorder, chronic back pain, anxiety and depression who presented to an OSH with n/v, abd pain with labs showing lipase >800, consistent with acute pancreatitis, transferred to [facility] for possible ERCP. Procedure was done at 1/19 but was aborted due to difficult anatomy. Currently managing supportively. Pain control consulted for pain management.     Updates  -Pain management consulted for continued complaints of severe abd pain: increase gabapentin to 300 mg TID.  Will consider IV toradol.   - Peth results positive and indicative of: Heavy alcohol consumption over the past 2-4 weeks or chronic alcohol use       #Acute pancreatitis, likely alcohol-induced given positive Peth  #Pancreatic duct dilation  #Pancreatic cysts  :: MRI/MRCP at OSH (1/16): Several small pancreatic cystic lesions measuring up to 10 x 6 mm at the pancreatic tail. Additional smaller lesions (measuring up to 6 x 6 mm) and punctate cystic lesions towards the pancreatic body and head/uncinate. These may potentially communicate with the main pancreatic duct.   - Given cysts are <1cm, cysts should be monitored with repeat imaging in 12 months  -Was planned for percutaneous cholangiogram on 1-, however after further discussion with the IR team they did not believe ductal dilation seen on MRCP was due to ductal stenosis or obstruction and ultrasound prior to the procedure was not concerning for any significant ductal dilation.  Thus, the decision was made to cancel the IR guided cholangiogram.  - Peth results positive and indicative of: Heavy alcohol consumption over the past 2-4 weeks  or chronic alcohol use  Plan  - Continue regular diet w/ 70 g fat restriction.  -Pain management consulted for continued complaints of severe abd pain: increase gabapentin to 300 mg TID.  Will consider IV toradol.   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

## 2024-01-26 VITALS
HEART RATE: 99 BPM | OXYGEN SATURATION: 93 % | WEIGHT: 112 LBS | BODY MASS INDEX: 18 KG/M2 | DIASTOLIC BLOOD PRESSURE: 73 MMHG | TEMPERATURE: 98.8 F | RESPIRATION RATE: 17 BRPM | HEIGHT: 66 IN | SYSTOLIC BLOOD PRESSURE: 100 MMHG

## 2024-01-26 LAB
ALBUMIN SERPL BCP-MCNC: 3.4 G/DL (ref 3.4–5)
ANION GAP SERPL CALC-SCNC: 14 MMOL/L (ref 10–20)
BASOPHILS # BLD AUTO: 0.02 X10*3/UL (ref 0–0.1)
BASOPHILS NFR BLD AUTO: 0.2 %
BUN SERPL-MCNC: 17 MG/DL (ref 6–23)
CALCIUM SERPL-MCNC: 9.5 MG/DL (ref 8.6–10.6)
CHLORIDE SERPL-SCNC: 98 MMOL/L (ref 98–107)
CO2 SERPL-SCNC: 29 MMOL/L (ref 21–32)
CREAT SERPL-MCNC: 0.75 MG/DL (ref 0.5–1.05)
EGFRCR SERPLBLD CKD-EPI 2021: 87 ML/MIN/1.73M*2
EOSINOPHIL # BLD AUTO: 0.01 X10*3/UL (ref 0–0.7)
EOSINOPHIL NFR BLD AUTO: 0.1 %
ERYTHROCYTE [DISTWIDTH] IN BLOOD BY AUTOMATED COUNT: 13.5 % (ref 11.5–14.5)
GLUCOSE SERPL-MCNC: 78 MG/DL (ref 74–99)
HCT VFR BLD AUTO: 40.9 % (ref 36–46)
HGB BLD-MCNC: 13.2 G/DL (ref 12–16)
IMM GRANULOCYTES # BLD AUTO: 0.04 X10*3/UL (ref 0–0.7)
IMM GRANULOCYTES NFR BLD AUTO: 0.4 % (ref 0–0.9)
LYMPHOCYTES # BLD AUTO: 1.22 X10*3/UL (ref 1.2–4.8)
LYMPHOCYTES NFR BLD AUTO: 11.7 %
MAGNESIUM SERPL-MCNC: 1.58 MG/DL (ref 1.6–2.4)
MCH RBC QN AUTO: 33.9 PG (ref 26–34)
MCHC RBC AUTO-ENTMCNC: 32.3 G/DL (ref 32–36)
MCV RBC AUTO: 105 FL (ref 80–100)
MONOCYTES # BLD AUTO: 0.43 X10*3/UL (ref 0.1–1)
MONOCYTES NFR BLD AUTO: 4.1 %
NEUTROPHILS # BLD AUTO: 8.72 X10*3/UL (ref 1.2–7.7)
NEUTROPHILS NFR BLD AUTO: 83.5 %
NRBC BLD-RTO: 0 /100 WBCS (ref 0–0)
PHOSPHATE SERPL-MCNC: 3 MG/DL (ref 2.5–4.9)
PLATELET # BLD AUTO: 304 X10*3/UL (ref 150–450)
POTASSIUM SERPL-SCNC: 4.3 MMOL/L (ref 3.5–5.3)
RBC # BLD AUTO: 3.89 X10*6/UL (ref 4–5.2)
SODIUM SERPL-SCNC: 137 MMOL/L (ref 136–145)
WBC # BLD AUTO: 10.4 X10*3/UL (ref 4.4–11.3)

## 2024-01-26 PROCEDURE — 36415 COLL VENOUS BLD VENIPUNCTURE: CPT

## 2024-01-26 PROCEDURE — 80069 RENAL FUNCTION PANEL: CPT

## 2024-01-26 PROCEDURE — 2500000001 HC RX 250 WO HCPCS SELF ADMINISTERED DRUGS (ALT 637 FOR MEDICARE OP)

## 2024-01-26 PROCEDURE — 99239 HOSP IP/OBS DSCHRG MGMT >30: CPT

## 2024-01-26 PROCEDURE — 2500000004 HC RX 250 GENERAL PHARMACY W/ HCPCS (ALT 636 FOR OP/ED)

## 2024-01-26 PROCEDURE — 85025 COMPLETE CBC W/AUTO DIFF WBC: CPT

## 2024-01-26 PROCEDURE — 2500000001 HC RX 250 WO HCPCS SELF ADMINISTERED DRUGS (ALT 637 FOR MEDICARE OP): Performed by: STUDENT IN AN ORGANIZED HEALTH CARE EDUCATION/TRAINING PROGRAM

## 2024-01-26 PROCEDURE — 83735 ASSAY OF MAGNESIUM: CPT

## 2024-01-26 RX ORDER — DICYCLOMINE HYDROCHLORIDE 10 MG/1
10 CAPSULE ORAL 4 TIMES DAILY
Qty: 120 CAPSULE | Refills: 0 | Status: SHIPPED | OUTPATIENT
Start: 2024-01-26 | End: 2024-02-25

## 2024-01-26 RX ORDER — GABAPENTIN 300 MG/1
300 CAPSULE ORAL 3 TIMES DAILY
Qty: 90 CAPSULE | Refills: 0 | Status: SHIPPED | OUTPATIENT
Start: 2024-01-26 | End: 2024-02-25

## 2024-01-26 RX ORDER — TALC
6 POWDER (GRAM) TOPICAL NIGHTLY
Qty: 60 TABLET | Refills: 0 | Status: SHIPPED | OUTPATIENT
Start: 2024-01-26 | End: 2024-02-25

## 2024-01-26 RX ORDER — OXYCODONE AND ACETAMINOPHEN 5; 325 MG/1; MG/1
1 TABLET ORAL EVERY 6 HOURS PRN
Qty: 15 TABLET | Refills: 0 | Status: SHIPPED | OUTPATIENT
Start: 2024-01-26 | End: 2024-02-02

## 2024-01-26 RX ORDER — AMOXICILLIN 250 MG
1 CAPSULE ORAL NIGHTLY
Qty: 30 TABLET | Refills: 1 | Status: SHIPPED | OUTPATIENT
Start: 2024-01-26 | End: 2024-03-26

## 2024-01-26 RX ORDER — PANTOPRAZOLE SODIUM 40 MG/1
40 TABLET, DELAYED RELEASE ORAL
Qty: 30 TABLET | Refills: 1 | Status: SHIPPED | OUTPATIENT
Start: 2024-01-27 | End: 2024-03-27

## 2024-01-26 RX ORDER — POLYETHYLENE GLYCOL 3350 17 G/17G
17 POWDER, FOR SOLUTION ORAL 2 TIMES DAILY
Qty: 60 PACKET | Refills: 1 | Status: SHIPPED | OUTPATIENT
Start: 2024-01-26 | End: 2024-03-26

## 2024-01-26 RX ADMIN — PANTOPRAZOLE SODIUM 40 MG: 40 TABLET, DELAYED RELEASE ORAL at 06:27

## 2024-01-26 RX ADMIN — BUPROPION HYDROCHLORIDE 150 MG: 150 TABLET, FILM COATED, EXTENDED RELEASE ORAL at 08:07

## 2024-01-26 RX ADMIN — GABAPENTIN 300 MG: 300 CAPSULE ORAL at 08:08

## 2024-01-26 RX ADMIN — OXYCODONE HYDROCHLORIDE AND ACETAMINOPHEN 1 TABLET: 5; 325 TABLET ORAL at 03:28

## 2024-01-26 RX ADMIN — DICYCLOMINE HYDROCHLORIDE 10 MG: 10 CAPSULE ORAL at 06:27

## 2024-01-26 RX ADMIN — THIAMINE HCL TAB 100 MG 100 MG: 100 TAB at 08:08

## 2024-01-26 RX ADMIN — HYDROMORPHONE HYDROCHLORIDE 1 MG: 2 TABLET ORAL at 08:07

## 2024-01-26 RX ADMIN — SERTRALINE HYDROCHLORIDE 100 MG: 100 TABLET ORAL at 08:08

## 2024-01-26 ASSESSMENT — COGNITIVE AND FUNCTIONAL STATUS - GENERAL
CLIMB 3 TO 5 STEPS WITH RAILING: A LITTLE
MOBILITY SCORE: 23
DAILY ACTIVITIY SCORE: 24

## 2024-01-26 ASSESSMENT — PAIN SCALES - GENERAL
PAINLEVEL_OUTOF10: 8
PAINLEVEL_OUTOF10: 9

## 2024-01-26 ASSESSMENT — PAIN - FUNCTIONAL ASSESSMENT: PAIN_FUNCTIONAL_ASSESSMENT: 0-10

## 2024-01-26 ASSESSMENT — PAIN DESCRIPTION - LOCATION: LOCATION: ABDOMEN

## 2024-01-26 NOTE — PROGRESS NOTES
Occupational Therapy    Occupational Therapy    Screen       Patient Name: Cece Massey  MRN: [id number]  Today's Date: 1/26/2024         General:  General Comment: Pt screened for OT services. Pt is independent with functional mobility and ADLs, pt observed ambulating to bathroom, has no difficulty with ADLs. Pt reports her  is available 24/7 at D/C.           01/26/24 at 9:49 AM   Maribel Christianson, OT   Rehab Office: [phone]

## 2024-01-26 NOTE — SIGNIFICANT EVENT
Rapid Response RN at bedside for RADAR score 6 due to the following VS: T 37.1 °Celsius;  ; RR 17; /73; SPO2 90% .      Reviewed abnormal VS with bedside RN who expressed no concerns regarding the patient's current condition based upon these.  Rechecked VS with an increased oxygen saturation noted.  No interventions by Rapid Response team indicated at this time.

## 2024-01-26 NOTE — NURSING NOTE
1/26/2024 1325 Discharge Note  Patient discharged home with no needs. Discharge instructions discussed with patient, verbalized understanding. Aware of follow up appointments needed and meds sent to pharmacy. Peripheral IV discontinued. Belongings gathered per patient and . Transported off unit via wheelchair with  at 1215. ---- Ev Pa RN

## 2024-01-26 NOTE — DISCHARGE SUMMARY
Discharge Diagnosis  Choledocholithiasis    Issues Requiring Follow-Up  Chronic pain outpatient for continued complaints of severe abdominal pain despite resolution of pancreatitis and unremarkable physical exam    Test Results Pending At Discharge  Pending Labs       No current pending labs.            Hospital Course  Ms. Massey is a 68 yo F with h/o Bilroth II for perforated gastric ulcer, cholecystectomy, tobacco use disorder, chronic back pain, anxiety and depression who presented to an OSH with n/v, abd pain. At the OSH labs were significant for lipase >800, which along with her presentation were consistent with acute pancreatitis. CT A/P showed pancreatic duct abnormalities and multiple subcentimeter cystic lesions in the pancreas. MRI/MRCP showed biliary duct and common bile duct dilation and possible choledocholithiasis, as well as the multiple subcentimeter cystic lesions in the pancreas and pancreatic duct dilation as noted on the CT. She was transferred to [facility] for possible ERCP given findings of possible choledocholithiasis on MRI/MRCP. Here she underwent ERCP but it was difficult to maneuver given her anatomy s/p Billroth II. No mass was seen at the papilla as noted on MRI. Given clear bile was noted, and lack of ampullary mass, in setting of normal LFTs, the decision was made to abort further attempts. Was planned for percutaneous cholangiogram on 1-, however after further discussion with the IR team they did not believe ductal dilation seen on MRCP was due to ductal stenosis or obstruction and ultrasound prior to the procedure was not concerning for any significant ductal dilation.  Thus, the decision was made to cancel the IR guided cholangiogram. Peth results positive and indicative of: Heavy alcohol consumption over the past 2-4 weeks or chronic alcohol use   Pt continued to complain of severe abdominal pain (though unremarkable physical exam w/ no guarding on deep palpation) requiring  frequent opiates, gabapentin, dycyclomine to be started, which did not adequately control her pain. She will follow-up with chronic pain outpatient.  She has tolerated regular diet for several days now and is medically stable for discharge.    Pertinent Physical Exam At Time of Discharge  Physical Exam  Constitutional:       Appearance: She is not ill-appearing.   HENT:      Mouth/Throat:      Mouth: Mucous membranes are dry.   Cardiovascular:      Rate and Rhythm: Normal rate and regular rhythm.      Heart sounds: Normal heart sounds.   Pulmonary:      Breath sounds: Normal breath sounds.   Abdominal:      General: Abdomen is flat. There is no distension.      Tenderness: There is abdominal tenderness (generalized circumferential abdominal pain w/ bilateral back pain). There is NO guarding on DEEP palpation of abdomen. There is no rebound.   Musculoskeletal:      Right lower leg: No edema.      Left lower leg: No edema.   Neurological:      General: No focal deficit present.      Mental Status: She is oriented to person, place, and time.     Home Medications     Medication List      START taking these medications     dicyclomine 10 mg capsule; Commonly known as: Bentyl; Take 1 capsule (10   mg) by mouth 4 times a day.   gabapentin 300 mg capsule; Commonly known as: Neurontin; Take 1 capsule   (300 mg) by mouth 3 times a day.   melatonin 3 mg tablet; Take 2 tablets (6 mg) by mouth once daily at   bedtime.   oxyCODONE-acetaminophen 5-325 mg tablet; Commonly known as: Percocet;   Take 1 tablet by mouth every 6 hours if needed for severe pain (7 - 10)   for up to 7 days.   pantoprazole 40 mg EC tablet; Commonly known as: ProtoNix; Take 1 tablet   (40 mg) by mouth once daily in the morning. Take before meals. Do not   crush, chew, or split. Do not start before January 27, 2024.; Start taking   on: January 27, 2024   polyethylene glycol 17 gram packet; Commonly known as: Glycolax,   Miralax; Take 17 g by mouth 2 times a  day.   sennosides-docusate sodium 8.6-50 mg tablet; Commonly known as:   Ana María-Colace; Take 1 tablet by mouth once daily at bedtime.       Outpatient Follow-Up  No future appointments.    Catalino Ruiz MD